# Patient Record
Sex: FEMALE | Race: WHITE | Employment: FULL TIME | URBAN - METROPOLITAN AREA
[De-identification: names, ages, dates, MRNs, and addresses within clinical notes are randomized per-mention and may not be internally consistent; named-entity substitution may affect disease eponyms.]

---

## 2017-03-09 DIAGNOSIS — M54.9 BACK PAIN WITHOUT RADICULOPATHY: Primary | ICD-10-CM

## 2017-03-09 DIAGNOSIS — M51.36 DDD (DEGENERATIVE DISC DISEASE), LUMBAR: ICD-10-CM

## 2017-03-14 ENCOUNTER — HOSPITAL ENCOUNTER (OUTPATIENT)
Dept: PHYSICAL THERAPY | Age: 42
Discharge: HOME | End: 2017-03-31
Admitting: PHYSICAL MEDICINE & REHABILITATION

## 2017-03-21 ENCOUNTER — HOSPITAL ENCOUNTER (OUTPATIENT)
Dept: PHYSICAL THERAPY | Age: 42
Discharge: HOME | End: 2017-03-21
Admitting: PHYSICAL MEDICINE & REHABILITATION

## 2017-03-23 ENCOUNTER — HOSPITAL ENCOUNTER (OUTPATIENT)
Dept: PHYSICAL THERAPY | Age: 42
Discharge: HOME | End: 2017-03-23
Admitting: PHYSICAL MEDICINE & REHABILITATION

## 2017-04-04 ENCOUNTER — OFFICE VISIT (OUTPATIENT)
Dept: ORTHOPEDIC SURGERY | Age: 42
End: 2017-04-04

## 2017-04-04 VITALS
BODY MASS INDEX: 27.31 KG/M2 | HEIGHT: 64 IN | SYSTOLIC BLOOD PRESSURE: 118 MMHG | HEART RATE: 68 BPM | DIASTOLIC BLOOD PRESSURE: 61 MMHG | WEIGHT: 160 LBS

## 2017-04-04 DIAGNOSIS — M54.16 LUMBAR RADICULITIS: ICD-10-CM

## 2017-04-04 DIAGNOSIS — M51.36 DDD (DEGENERATIVE DISC DISEASE), LUMBAR: Primary | ICD-10-CM

## 2017-04-04 PROCEDURE — 99214 OFFICE O/P EST MOD 30 MIN: CPT | Performed by: PHYSICIAN ASSISTANT

## 2017-04-04 RX ORDER — IBUPROFEN 200 MG
200 TABLET ORAL EVERY 6 HOURS PRN
COMMUNITY

## 2017-04-04 RX ORDER — METHYLPREDNISOLONE 4 MG/1
TABLET ORAL
Qty: 1 KIT | Refills: 0 | Status: SHIPPED | OUTPATIENT
Start: 2017-04-04 | End: 2017-04-10

## 2017-04-12 ENCOUNTER — TELEPHONE (OUTPATIENT)
Dept: ORTHOPEDIC SURGERY | Age: 42
End: 2017-04-12

## 2017-04-12 ENCOUNTER — OFFICE VISIT (OUTPATIENT)
Dept: ORTHOPEDIC SURGERY | Age: 42
End: 2017-04-12

## 2017-04-12 VITALS
BODY MASS INDEX: 27.33 KG/M2 | HEART RATE: 86 BPM | DIASTOLIC BLOOD PRESSURE: 77 MMHG | SYSTOLIC BLOOD PRESSURE: 113 MMHG | WEIGHT: 160.05 LBS | HEIGHT: 64 IN

## 2017-04-12 DIAGNOSIS — M54.16 LUMBAR RADICULITIS: ICD-10-CM

## 2017-04-12 DIAGNOSIS — M51.36 DDD (DEGENERATIVE DISC DISEASE), LUMBAR: Primary | ICD-10-CM

## 2017-04-12 PROCEDURE — 99214 OFFICE O/P EST MOD 30 MIN: CPT | Performed by: PHYSICIAN ASSISTANT

## 2017-04-25 ENCOUNTER — HOSPITAL ENCOUNTER (OUTPATIENT)
Dept: PAIN MANAGEMENT | Age: 42
Discharge: HOME | End: 2017-04-25
Attending: PHYSICAL MEDICINE & REHABILITATION | Admitting: PHYSICAL MEDICINE & REHABILITATION

## 2017-04-25 VITALS
OXYGEN SATURATION: 100 % | HEIGHT: 64 IN | SYSTOLIC BLOOD PRESSURE: 121 MMHG | BODY MASS INDEX: 26.46 KG/M2 | HEART RATE: 74 BPM | WEIGHT: 155 LBS | RESPIRATION RATE: 18 BRPM | TEMPERATURE: 97.9 F | DIASTOLIC BLOOD PRESSURE: 71 MMHG

## 2017-04-25 ASSESSMENT — PAIN DESCRIPTION - DESCRIPTORS: DESCRIPTORS: ACHING;DULL;SHARP

## 2017-04-25 ASSESSMENT — PAIN - FUNCTIONAL ASSESSMENT: PAIN_FUNCTIONAL_ASSESSMENT: 0-10

## 2017-05-17 ENCOUNTER — OFFICE VISIT (OUTPATIENT)
Dept: ORTHOPEDIC SURGERY | Age: 42
End: 2017-05-17

## 2017-05-17 VITALS
SYSTOLIC BLOOD PRESSURE: 123 MMHG | HEIGHT: 64 IN | BODY MASS INDEX: 26.46 KG/M2 | WEIGHT: 154.98 LBS | HEART RATE: 86 BPM | DIASTOLIC BLOOD PRESSURE: 83 MMHG

## 2017-05-17 DIAGNOSIS — M51.36 DDD (DEGENERATIVE DISC DISEASE), LUMBAR: Primary | ICD-10-CM

## 2017-05-17 PROCEDURE — L0626 LO SAG RIG PNL STAYS PRE CST: HCPCS | Performed by: PHYSICAL MEDICINE & REHABILITATION

## 2017-05-17 PROCEDURE — L0625 LO FLEX L1-BELOW L5 PRE OTS: HCPCS | Performed by: PHYSICAL MEDICINE & REHABILITATION

## 2017-05-17 PROCEDURE — 99213 OFFICE O/P EST LOW 20 MIN: CPT | Performed by: PHYSICAL MEDICINE & REHABILITATION

## 2017-07-18 ENCOUNTER — OFFICE VISIT (OUTPATIENT)
Dept: ORTHOPEDIC SURGERY | Age: 42
End: 2017-07-18

## 2017-07-18 VITALS — WEIGHT: 152 LBS | HEIGHT: 64 IN | BODY MASS INDEX: 25.95 KG/M2

## 2017-07-18 DIAGNOSIS — M70.61 TROCHANTERIC BURSITIS OF RIGHT HIP: Primary | ICD-10-CM

## 2017-07-18 PROCEDURE — 99213 OFFICE O/P EST LOW 20 MIN: CPT | Performed by: ORTHOPAEDIC SURGERY

## 2017-08-10 ENCOUNTER — OFFICE VISIT (OUTPATIENT)
Dept: DERMATOLOGY | Age: 42
End: 2017-08-10

## 2017-08-10 DIAGNOSIS — Z86.018 HISTORY OF DYSPLASTIC NEVUS: ICD-10-CM

## 2017-08-10 DIAGNOSIS — Z80.8 FAMILY HISTORY OF MELANOMA: ICD-10-CM

## 2017-08-10 DIAGNOSIS — D22.9 MULTIPLE BENIGN NEVI: Primary | ICD-10-CM

## 2017-08-10 DIAGNOSIS — Z12.83 SCREENING EXAM FOR SKIN CANCER: ICD-10-CM

## 2017-08-10 DIAGNOSIS — L57.0 ACTINIC KERATOSES: ICD-10-CM

## 2017-08-10 PROCEDURE — 17000 DESTRUCT PREMALG LESION: CPT | Performed by: DERMATOLOGY

## 2017-08-10 PROCEDURE — 99213 OFFICE O/P EST LOW 20 MIN: CPT | Performed by: DERMATOLOGY

## 2017-08-10 PROCEDURE — 17003 DESTRUCT PREMALG LES 2-14: CPT | Performed by: DERMATOLOGY

## 2017-10-31 ENCOUNTER — TELEPHONE (OUTPATIENT)
Dept: DERMATOLOGY | Age: 42
End: 2017-10-31

## 2017-10-31 NOTE — TELEPHONE ENCOUNTER
Patient calling wanting to get an appointment in [facility] with  Dr. Mark Juarez seen Dr. Mark Juarez on  8/10/17 for skin check. Patient states she has a spot on her right ear thats been there a long time, she will pick at it and it bleeds and will scab over but never goes away. She would like to get it looked at. I told her I would send message through to [facility].   Patient's number is [phone]

## 2017-11-01 NOTE — TELEPHONE ENCOUNTER
Call returned to PT re: appt - N/A L/M to call back to discuss scheduling appt.      PT last seen 8/10/17 - skin exam - AK's (freezing)

## 2017-11-16 ENCOUNTER — OFFICE VISIT (OUTPATIENT)
Dept: DERMATOLOGY | Age: 42
End: 2017-11-16

## 2017-11-16 DIAGNOSIS — D48.5 NEOPLASM OF UNCERTAIN BEHAVIOR OF SKIN: Primary | ICD-10-CM

## 2017-11-16 PROCEDURE — 69100 BIOPSY OF EXTERNAL EAR: CPT | Performed by: DERMATOLOGY

## 2017-11-16 NOTE — PROGRESS NOTES
[facility]) Dermatology  Cyril Portillo MD  [phone]      Geno Maurice  1975    43 y.o. female     Date of Visit: 11/16/2017    Last Visit: 3mo    Chief Complaint: Lesion    History of Present Illness:  1. Complains of recurrent sore on R ear that has been flaring every 6mo or so for the past 2 years. Pt admits to manipulating lesion frequently and attributes this to the fact that it takes 1-2mo to heal. No associated pain. Derm History:   -Hx genital HSV (R buttock) - previously treated w/ famvir  -Hx perioral dermatitis - treated w/ tetracycline, PO erythromycin and metrocream  -Mild ET/PP rosacea - metrogel prn   -History of actinic keratoses s/p cryotherapy.    -Hx dysplastic nevi    Review of Systems: None     Past Medical History, Surgical History, Medications and Allergies reviewed. Past Medical History:   Diagnosis Date    Endometriosis 0    H/O blood clots 06/2017    RT leg X 2  per PT     Headache      Past Surgical History:   Procedure Laterality Date    CERVICAL POLYP REMOVAL      CHOLECYSTECTOMY, LAPAROSCOPIC  1998    LAPAROSCOPIC APPENDECTOMY  1998       Allergies   Allergen Reactions    Bee Venom     Penicillins     Mastisol Adhesive [Wound Dressing Adhesive] Swelling and Rash     Adhesive for steri-strips     Outpatient Prescriptions Marked as Taking for the 11/16/17 encounter (Office Visit) with Cyril Portillo MD   Medication Sig Dispense Refill    rivaroxaban (XARELTO) 20 MG TABS tablet Take 20 mg by mouth      ibuprofen (ADVIL;MOTRIN) 200 MG tablet Take 200 mg by mouth every 6 hours as needed for Pain      ferrous sulfate 325 (65 FE) MG tablet Take 325 mg by mouth daily (with breakfast)      amitriptyline (ELAVIL) 10 MG tablet Start with 1 tab by mouth nightly; may increase dose to 2, then 3 tabs nightly as tolerated.  (Patient taking differently: Indications: RI for migraines per PT Start with 1 tab by mouth nightly; may increase dose to 2, then 3 tabs nightly as

## 2017-11-16 NOTE — PATIENT INSTRUCTIONS

## 2017-11-27 ENCOUNTER — TELEPHONE (OUTPATIENT)
Dept: DERMATOLOGY | Age: 42
End: 2017-11-27

## 2017-11-27 DIAGNOSIS — C44.212 BASAL CELL CARCINOMA OF HELIX, RIGHT: Primary | ICD-10-CM

## 2017-11-27 NOTE — TELEPHONE ENCOUNTER
Spoke with patient and informed her of biopsy result from 11-16-17. 1.Location: Right superior helix     Result: Basal cell carcinoma, metatypical, infiltrating pattern. Plan: Refer to Mohs. Patient referred to Aleja Yung for Mohs, given all pertinent information. A six month skin exam is scheduled for 6-11-18.

## 2017-11-30 ENCOUNTER — TELEPHONE (OUTPATIENT)
Dept: DERMATOLOGY | Age: 42
End: 2017-11-30

## 2017-11-30 DIAGNOSIS — C44.212: Primary | ICD-10-CM

## 2017-11-30 NOTE — TELEPHONE ENCOUNTER
Spoke with patient, gave her names and information for , Bonita Miles, and Julia Carver. She will check them out and call their office to see if they are able to get her in before 12-31-17. Advised her to let us know if she decides to go elsewhere and I would be happy to forward referral and info.

## 2018-03-27 ENCOUNTER — OFFICE VISIT (OUTPATIENT)
Dept: ORTHOPEDIC SURGERY | Age: 43
End: 2018-03-27

## 2018-03-27 VITALS
HEART RATE: 82 BPM | SYSTOLIC BLOOD PRESSURE: 123 MMHG | DIASTOLIC BLOOD PRESSURE: 83 MMHG | HEIGHT: 64 IN | WEIGHT: 151.9 LBS | BODY MASS INDEX: 25.93 KG/M2

## 2018-03-27 DIAGNOSIS — M70.61 GREATER TROCHANTERIC BURSITIS OF RIGHT HIP: ICD-10-CM

## 2018-03-27 DIAGNOSIS — M25.551 RIGHT HIP PAIN: Primary | ICD-10-CM

## 2018-03-27 PROCEDURE — 99215 OFFICE O/P EST HI 40 MIN: CPT | Performed by: ORTHOPAEDIC SURGERY

## 2018-03-27 NOTE — PROGRESS NOTES
Diagnosis    Enthesopathy of hip region    Tear of lateral cartilage or meniscus of knee, current    Scapular dyskinesis    Tendinitis of right rotator cuff    Hashimoto's thyroiditis- Dr Emerald Alatorre Endometriosis    PMS (premenstrual syndrome)    Pelvic pain in female    Chronic tension-type headache, not intractable    Trochanteric bursitis of right hip    Subfertility, female    Other ovarian dysfunction- luteal progesterone/estradiol defect     Past Medical History:   Diagnosis Date    Endometriosis 1998    H/O blood clots 06/2017    RT leg X 2  per PT     Headache      Past Surgical History:   Procedure Laterality Date    CERVICAL POLYP REMOVAL      CHOLECYSTECTOMY, LAPAROSCOPIC  1998    LAPAROSCOPIC APPENDECTOMY  1998       Allergies:  Bee venom; Penicillins; and Mastisol adhesive [wound dressing adhesive]    Medications:  Outpatient Prescriptions Marked as Taking for the 3/27/18 encounter (Office Visit) with Desmond Rivera MD   Medication Sig Dispense Refill    norethindrone (AYGESTIN) 5 MG tablet Take 5 mg by mouth      rivaroxaban (XARELTO) 20 MG TABS tablet Take 20 mg by mouth      rivaroxaban 15 & 20 MG Starter Pack Take by mouth Indications: Xarelto       ibuprofen (ADVIL;MOTRIN) 200 MG tablet Take 200 mg by mouth every 6 hours as needed for Pain      ferrous sulfate 325 (65 FE) MG tablet Take 325 mg by mouth daily (with breakfast)      amitriptyline (ELAVIL) 10 MG tablet Start with 1 tab by mouth nightly; may increase dose to 2, then 3 tabs nightly as tolerated.  (Patient taking differently: Indications: CT for migraines per PT Start with 1 tab by mouth nightly; may increase dose to 2, then 3 tabs nightly as tolerated.) 90 tablet 3    levothyroxine (SYNTHROID) 50 MCG tablet Take 50 mcg by mouth      Prenatal Vit-Fe Fumarate-FA (PREPLUS) 27-1 MG TABS        Social History     Social History    Marital status:      Spouse name: Cori Montenegro Number of children: 0    Years of rashes  [x] Leg lengths equal  [] Ecchymosis:  [x] none  [] mild  [] moderate  [] severe   [] Atrophy:  [x] none  [] mild  [] moderate  [] severe      Range of Motion:  [x] No flexion contracture         [] Deferred: acute injury/post-surgery/pain  [] Flexion contracture     Forward flexion: 110  Supine Internal rotation: 20 Negative labral stress test  Supine External rotation: 65  Abduction: 60  Adduction: 30      Palpation:   Moderate Tenderness over greater trochanter      Provocative Tests:  [x] Negative  Positive Tests:  [] Log Roll   [] Gavin Test: ITB Tightness   [] FADIR Anterior impingement: Negative   [] Posterior Impingement    [] Shuck test for insufficient suction seal   [] Dial test for capsular insufficiency:    [] Resisted adduction for athletic pubalgia   [] Resisted curl up for athletic pubalgia     Motor Function:  [x] No gross motor weakness of hip [x] No gross motor weakness of knee  [x] No gross motor weakness of ankle    [x] No gross motor weakness of great toe    [] Motor strength:   [x] Hip Flex [x] 5/5 [] 4/5 [] 3/5 [] 2/5 [] 1/5 [] 0/5   [x] Hip ABductors [x] 5-/5 [] 4/5 [] 3/5 [] 2/5 [] 1/5 [] 0/5   [x] Hip ADductors [x] 5/5 [] 4/5 [] 3/5 [] 2/5 [] 1/5 [] 0/5     Neurologic:  [x] Sensation to light touch intact  [x] Coordination / proprioception intact    Circulation:  [x] The limb is warm and well perfused. [x] Capillary refill is intact. [] Edema:  [x] none  [] mild  [] moderate  [] severe     Data Reviewed:     XRays:  (2 views: Standing AP, 45 degree Jules) of her right hip and pelvis taken today 3/27/18 in the office and reviewed by me personally showed: Well preserved joint space. No radiolucent lines to suggest fracture or any osteoblastic lesions. Other Imaging:  none    Assessment:     Mayuri Craft is a 43y.o. year old female who presents with Chronic right sided Greater trochanteric pain syndrome.   This is a condition which is comprised of trochanteric bursitis, IT band focal tightness, and gluteus medius tendinopathy. It is a chronic condition very commonly seen in this age group. Generally, speaking extra-articular etiologies of hip pain respond extremely well to nonoperative management involving PT, injections and/or NSAIDs, although the rate of clinical improvement is slow and requires patience and consistency with therapy. Patient has had these symptoms for several years and has had a variety of treatments. She still desires to treat this conservatively    Diagnosis:   1. Right hip pain  XR HIP RIGHT (2-3 VIEWS)   2. Greater trochanteric bursitis of right hip  [facility] - [facility]         Plan:     I discussed the diagnosis and the treatment options with Brock Valenzuela today as well as the nature of the condition. I am going to refer for PT using my specialized program for this condition. We will see if this provides her any benefit. It is possible that her muscles just need a bit of a different approach for strengthening. Additionally, I indicated to her that we can do trochanteric injections again every 2 years as I think this is relatively safe. However, we likely will send her for an MRI if she has no significant improvement over the next few weeks. Return to Clinic/Follow - Up:  6-8 weeks PRN    Brock Valenzuela was instructed to call the office if her symptoms worsen or if new symptoms appear prior to the next scheduled visit. She is specifically instructed to contact the office between now & her scheduled appointment if she has concerns related to her condition or if she needs assistance in scheduling the above tests. She is welcome to call for an appointment sooner if she has any additional concerns or questions. Patient Education Materials Provided:  [x] Dr Campos Laughter: New patient folder,  Anatomic Drawings and treatment algorithms    There are no Patient Instructions on file for this visit.        Orders Placed This

## 2018-04-08 ENCOUNTER — TELEPHONE (OUTPATIENT)
Dept: ORTHOPEDIC SURGERY | Age: 43
End: 2018-04-08

## 2018-06-11 ENCOUNTER — OFFICE VISIT (OUTPATIENT)
Dept: DERMATOLOGY | Age: 43
End: 2018-06-11

## 2018-06-11 DIAGNOSIS — L57.0 ACTINIC KERATOSES: ICD-10-CM

## 2018-06-11 DIAGNOSIS — D22.9 MULTIPLE BENIGN NEVI: Primary | ICD-10-CM

## 2018-06-11 DIAGNOSIS — Z85.828 HISTORY OF NONMELANOMA SKIN CANCER: ICD-10-CM

## 2018-06-11 DIAGNOSIS — Z12.83 SCREENING EXAM FOR SKIN CANCER: ICD-10-CM

## 2018-06-11 DIAGNOSIS — Z80.8 FAMILY HISTORY OF MELANOMA: ICD-10-CM

## 2018-06-11 DIAGNOSIS — Z86.018 HISTORY OF DYSPLASTIC NEVUS: ICD-10-CM

## 2018-06-11 DIAGNOSIS — D48.5 NEOPLASM OF UNCERTAIN BEHAVIOR OF SKIN: ICD-10-CM

## 2018-06-11 PROCEDURE — 99213 OFFICE O/P EST LOW 20 MIN: CPT | Performed by: DERMATOLOGY

## 2018-06-11 PROCEDURE — 11100 PR BIOPSY OF SKIN LESION: CPT | Performed by: DERMATOLOGY

## 2018-06-11 PROCEDURE — 17000 DESTRUCT PREMALG LESION: CPT | Performed by: DERMATOLOGY

## 2018-06-11 PROCEDURE — 17003 DESTRUCT PREMALG LES 2-14: CPT | Performed by: DERMATOLOGY

## 2018-06-11 RX ORDER — ASCORBIC ACID 500 MG
500 TABLET ORAL DAILY
COMMUNITY

## 2018-06-19 ENCOUNTER — TELEPHONE (OUTPATIENT)
Dept: DERMATOLOGY | Age: 43
End: 2018-06-19

## 2018-07-02 ENCOUNTER — NURSE ONLY (OUTPATIENT)
Dept: DERMATOLOGY | Age: 43
End: 2018-07-02

## 2018-07-02 DIAGNOSIS — L81.4 SOLAR LENTIGO: Primary | ICD-10-CM

## 2018-07-02 PROCEDURE — 99211 OFF/OP EST MAY X REQ PHY/QHP: CPT | Performed by: DERMATOLOGY

## 2018-07-02 NOTE — PROGRESS NOTES
Patient concerned that area on chest is still red from biopsy on chest 6-11-18. Area is slightly red but very well healed with NO signs of infection. Advised to continue with Aquaphor, try not to manipulate or scratch area. Give a little more time for redness to go away. Patient agreed. Left office in no acute distress.

## 2018-07-17 ENCOUNTER — OFFICE VISIT (OUTPATIENT)
Dept: ORTHOPEDIC SURGERY | Age: 43
End: 2018-07-17

## 2018-07-17 ENCOUNTER — PRE-EVALUATION (OUTPATIENT)
Dept: ORTHOPEDIC SURGERY | Age: 43
End: 2018-07-17

## 2018-07-17 VITALS
HEART RATE: 68 BPM | HEIGHT: 64 IN | BODY MASS INDEX: 23.22 KG/M2 | SYSTOLIC BLOOD PRESSURE: 110 MMHG | WEIGHT: 136 LBS | DIASTOLIC BLOOD PRESSURE: 68 MMHG

## 2018-07-17 DIAGNOSIS — M22.41 CHONDROMALACIA OF RIGHT PATELLOFEMORAL JOINT: ICD-10-CM

## 2018-07-17 DIAGNOSIS — M25.561 RIGHT KNEE PAIN, UNSPECIFIED CHRONICITY: Primary | ICD-10-CM

## 2018-07-17 PROCEDURE — APPNB30 APP NON BILLABLE TIME 0-30 MINS: Performed by: PHYSICIAN ASSISTANT

## 2018-07-17 PROCEDURE — 99214 OFFICE O/P EST MOD 30 MIN: CPT | Performed by: ORTHOPAEDIC SURGERY

## 2018-07-17 NOTE — PROGRESS NOTES
Knee Brace  [] Cane  [] Crutches   [] Ree Harness   [] Wheelchair  [] Other    RIGHT Knee ORTHOPAEDIC  EXAM:   Inspection:  [x] Skin intact without abrasion, lacerations or rashes  [x] Leg lengths equal  [x] Effusion:  [] none  [] mild  [] moderate  [] severe     Range of Motion:  [x] No flexion contracture         [] Deferred: acute injury/post-surgery/pain    [x]Flexion: 0-130° with mild discomfort at end range flexion    Palpation:   [] No Tenderness  [x] Tenderness: Diffuse tenderness medially [x] mild  [] moderate  [] severe   [] Patellofemoral Crepitation:  [] none  [x] mild  [] moderate  [] severe    Ligamentous and Provocative testing:  [x] Negative - Stable in varus/valgus at 30 and 0 deg flexion, negative posterior drawer, negative Lachman    Motor Function:  [x] No gross motor weakness of hip [x] No gross motor weakness of knee  [x] No gross motor weakness of ankle    [x] No gross motor weakness of great toe    [x] Motor strength: 5/5 L4-S1    Neurologic:   [x] Sensation to light touch intact  [x] Coordination / proprioception intact  Motor function intact L2-S1    Circulation:  [x] The limb is warm and well perfused. [x] Capillary refill is intact. [x] Edema:  [x] none  [] mild  [] moderate  [] severe     Negative Homans sign - no calf tenderness    Additional Examinations:         Contralateral Lower Extremity: Examination of the contralateral lower extremity does not show any tenderness, deformity or injury. Range of motion is unremarkable. There is no gross instability. There are no rashes, ulcerations or lesions. Strength and tone are normal.    Radiographic:  4 xray of the right  knee taken in our office today 7/17/18 reveal no fractures, dislocations, visible tumors, or signs of acute trauma. There are mild-to-moderate degenerative changes in the patellofemoral compartment.        Assessment :  12-year-old female with right knee pain that appears to be from patellofemoral

## 2018-09-11 ENCOUNTER — OFFICE VISIT (OUTPATIENT)
Dept: ORTHOPEDIC SURGERY | Age: 43
End: 2018-09-11

## 2018-09-11 VITALS
SYSTOLIC BLOOD PRESSURE: 100 MMHG | DIASTOLIC BLOOD PRESSURE: 69 MMHG | WEIGHT: 136 LBS | BODY MASS INDEX: 23.22 KG/M2 | HEIGHT: 64 IN | HEART RATE: 73 BPM

## 2018-09-11 DIAGNOSIS — M25.561 RIGHT KNEE PAIN, UNSPECIFIED CHRONICITY: Primary | ICD-10-CM

## 2018-09-11 DIAGNOSIS — M22.41 CHONDROMALACIA OF RIGHT PATELLOFEMORAL JOINT: ICD-10-CM

## 2018-09-11 PROCEDURE — 20610 DRAIN/INJ JOINT/BURSA W/O US: CPT | Performed by: ORTHOPAEDIC SURGERY

## 2018-09-11 PROCEDURE — 99214 OFFICE O/P EST MOD 30 MIN: CPT | Performed by: ORTHOPAEDIC SURGERY

## 2018-09-11 NOTE — PROGRESS NOTES
EXAM:   Inspection:  [x] Skin intact without abrasion, lacerations or rashes  [x] Leg lengths equal  [x] Effusion:  [x] none  [] mild  [] moderate  [] severe     Range of Motion:  [x] No flexion contracture         [] Deferred: acute injury/post-surgery/pain   [x]Flexion: 0-130 degrees    Palpation:   [] No Tenderness  [x] Tenderness: diffuse [x] mild  [] moderate  [] severe   [x] Patellofemoral Crepitation:  [] none  [x] mild  [] moderate  [] severe    Ligamentous and Provocative testing:  [x] Negative - Stable in varus/valgus at 30 and 0 deg flexion, negative posterior drawer, negative Lachman    Motor Function:  [x] No gross motor weakness of hip [x] No gross motor weakness of knee  [x] No gross motor weakness of ankle    [x] No gross motor weakness of great toe    [x] Motor strength: 5/5 L4-S1    Neurologic:   [x] Sensation to light touch intact  [x] Coordination / proprioception intact  Motor function intact L2-S1    Circulation:  [x] The limb is warm and well perfused. [x] Capillary refill is intact. [x] Edema:  [x] none  [] mild  [] moderate  [] severe     Negative Homans sign - no calf tenderness    Additional Examinations:         Contralateral Lower Extremity: Examination of the contralateral lower extremity does not show any tenderness, deformity or injury. Range of motion is unremarkable. There is no gross instability. There are no rashes, ulcerations or lesions. Strength and tone are normal.    Radiographic:  No new imaging studies were obtained today. Assessment :  36 y/o female with right knee pain that appear to be due to patellofemoral chondromalacia. She does have a history of right knee worker's comp injury in which she underwent right knee surgery in 1999 and 2000. Impression:  Encounter Diagnoses   Name Primary?     Right knee pain, unspecified chronicity Yes    Chondromalacia of right patellofemoral joint      Treatment Plan:  I did discuss the diagnosis and treatment options in my presence, and it is both accurate and complete.       Lulú Herman MD  Orthopaedic Surgeon, Sports Medicine  Director, Hip Arthroscopy and [address] [facility]  Molly [place] () - [phone]

## 2018-10-17 ENCOUNTER — TELEPHONE (OUTPATIENT)
Dept: ORTHOPEDIC SURGERY | Age: 43
End: 2018-10-17

## 2018-10-17 NOTE — TELEPHONE ENCOUNTER
Received a call from Mrs. Vale Davis.   She had a cortisone injection before she left town. The injection really flared up her hip and knee. She is wondering why this would happen. She previous saw Dr. Abena Chinchilla for her knee.   Requesting a return call [phone] from Dr. Dorathy Apley PA

## 2018-10-18 NOTE — TELEPHONE ENCOUNTER
SPOKE WITH THE PATIENT TODAY, AND SHE AGREES TO PLAN OF CARE GIVEN. SHE STILL WANTS THE INJECTION, AND I EXPLAINED TO HER I WILL FOLLOW UP WITH WC. DEPT.

## 2018-11-08 ENCOUNTER — OFFICE VISIT (OUTPATIENT)
Dept: ORTHOPEDIC SURGERY | Age: 43
End: 2018-11-08
Payer: COMMERCIAL

## 2018-11-08 VITALS
HEART RATE: 91 BPM | HEIGHT: 64 IN | BODY MASS INDEX: 23.22 KG/M2 | SYSTOLIC BLOOD PRESSURE: 105 MMHG | DIASTOLIC BLOOD PRESSURE: 71 MMHG | WEIGHT: 136 LBS

## 2018-11-08 DIAGNOSIS — M17.11 PRIMARY OSTEOARTHRITIS OF RIGHT KNEE: Primary | ICD-10-CM

## 2018-11-08 PROCEDURE — 99213 OFFICE O/P EST LOW 20 MIN: CPT | Performed by: ORTHOPAEDIC SURGERY

## 2018-11-09 NOTE — PROGRESS NOTES
Smokeless tobacco: Never Used    Alcohol use 0.0 oz/week      Comment: rare    Drug use: No    Sexual activity: Yes     Partners: Male      Comment: Bebo     Other Topics Concern    None     Social History Narrative    None       Current Outpatient Prescriptions   Medication Sig Dispense Refill    diclofenac sodium 1 % GEL Apply 4 g topically 4 times daily 2 Tube 5    vitamin C (ASCORBIC ACID) 500 MG tablet Take 500 mg by mouth daily      Glucosamine-Chondroit-Vit C-Mn (GLUCOSAMINE 1500 COMPLEX PO) Take by mouth      norethindrone (AYGESTIN) 5 MG tablet Take 5 mg by mouth      ibuprofen (ADVIL;MOTRIN) 200 MG tablet Take 200 mg by mouth every 6 hours as needed for Pain      ferrous sulfate 325 (65 FE) MG tablet Take 325 mg by mouth daily (with breakfast)      amitriptyline (ELAVIL) 10 MG tablet Start with 1 tab by mouth nightly; may increase dose to 2, then 3 tabs nightly as tolerated. (Patient taking differently: Indications: IA for migraines per PT Start with 1 tab by mouth nightly; may increase dose to 2, then 3 tabs nightly as tolerated.) 90 tablet 3    levothyroxine (SYNTHROID) 50 MCG tablet Take 50 mcg by mouth      Prenatal Vit-Fe Fumarate-FA (PREPLUS) 27-1 MG TABS        No current facility-administered medications for this visit. Allergies   Allergen Reactions    Bee Venom     Penicillins     Mastisol Adhesive [Wound Dressing Adhesive] Swelling and Rash     Adhesive for steri-strips       Review of Systems:  A 14 point review of systems was completed by the patient and is available in the media section of the scanned medical record and was reviewed on 11/9/2018. The review is negative with the exception of those things mentioned in the HPI and Past Medical History     Vital Signs:   /71   Pulse 91   Ht 5' 4\" (1.626 m)   Wt 136 lb (61.7 kg)   BMI 23.34 kg/m²     General Exam:   Mental Status: The patient is oriented to time, place and person.   The patient's mood and affect

## 2018-11-14 ENCOUNTER — HOSPITAL ENCOUNTER (OUTPATIENT)
Dept: PHYSICAL THERAPY | Age: 43
Setting detail: THERAPIES SERIES
Discharge: HOME | End: 2018-11-14
Payer: COMMERCIAL

## 2018-11-14 PROCEDURE — 97530 THERAPEUTIC ACTIVITIES: CPT | Performed by: PHYSICAL THERAPIST

## 2018-11-14 PROCEDURE — 97110 THERAPEUTIC EXERCISES: CPT | Performed by: PHYSICAL THERAPIST

## 2018-11-14 PROCEDURE — G8978 MOBILITY CURRENT STATUS: HCPCS | Performed by: PHYSICAL THERAPIST

## 2018-11-14 PROCEDURE — 97162 PT EVAL MOD COMPLEX 30 MIN: CPT | Performed by: PHYSICAL THERAPIST

## 2018-11-14 PROCEDURE — G8979 MOBILITY GOAL STATUS: HCPCS | Performed by: PHYSICAL THERAPIST

## 2018-11-14 NOTE — PLAN OF CARE
medial meniscectomy dating back to 1999 and 2000. This case was all handled by [facility]. Since that time she has also been seen by Dr. Merissa Plasencia for hip issues. The patient returns today due to continuing R knee pain. The patient reports that around late April she started to notice some swelling pain in her knee. She notes feeling a burning sensation under her knee/ kneecap. This pain occurs all the time and is reported as a 6 out of 10 at worse and 4 our of 10 at best when she is at rest. She notes attempting to continue with normal exercise and is able to complete exercise but notes having this constant pain. She does note having a cortisone injection to her R knee from Dr. Merissa Plasencia back in September, but notes this did not help. The patient reports no new injuries at this time. She indicates she is hoping to be able to receive another cortisone injectcion in December. Relevant Medical History:History for R LE DVT; R knee PFA/ OA; prior vascular repair; occasional LE numbness/ paresthesias (hip and LBP)  Functional Disability Index:PT G-Codes  Functional Assessment Tool Used: LEFS  Score: 58/ 80= 73%  Functional Limitation: Mobility: Walking and moving around  Mobility: Walking and Moving Around Current Status (): At least 20 percent but less than 40 percent impaired, limited or restricted  Mobility: Walking and Moving Around Goal Status ():  At least 1 percent but less than 20 percent impaired, limited or restricted    Pain Scale: 4/10  @ rest/ constant  6-7/ 10 @ worst  Easing factors: Rest/ ice/ meds  Provocative factors: Prolonged standing/ walking; stairs; kneeling and squatting; progressive exercise (bike)     Type: [x]Constant   []Intermittent  []Radiating []Localized []other:     Numbness/Tingling: Occasional; generalized to R LE    Occupation/School: Works full time from home; sedentary    Living Status/Prior Level of Function: Independent with ADLs and IADLs, Full NI cardio routines for elliptical cross training and stationary/ outdoors cycling; full stretching and strength training. OBJECTIVE:     LEFS Score: 58/ 80= 73% (11/14/18; Initial)     11-14-18  Flexibility L R Comment   Hamstring + +    Gastroc + +    ITB + ++    Quad + +            11-14-18  ROM PROM AROM Overpressure Comment    L R L R L R    Flexion 150 145        Extension +6 +4                              11-14-18  Strength L R Comment   Quad 5-/5 4-/5 JORGITO 0°   Hamstring 4+/5 4-/5 (pain)    Gastroc 4+/5 4/5    Hip  flexion 4+/5 4-/5 (pain)    Hip abd 4+/5 3+/5 (pain)                  11-14-18  Special Test Results/Comment   Meniscal Click (-)  (+)Pain with flexion and rotation   Crepitus 1+/3  30-0°   Flexion Test (+) Pain at deep flexion   Valgus Laxity (-)   Varus Laxity (-)   Lachmans (-)   Drop Back (-)   Homans (-)         11-14-18  Girth L R   Calf 36.8 37.5   Mid Patella 34.9 35.8   Suprapatellar 37.5 38.5   5cm above 42.0 41.9   15cm above 49.0 49.4     Reflexes/Sensation:    []Dermatomes/Myotomes intact    []Reflexes equal and normal bilaterally   [x]Other: NT    Joint mobility:    []Normal    []Hypo   [x]Hyper    Palpation: Tender along the inferior pole of the patella> patellar tendon> tibial tubercle; tenderness at the greater trochanter (difficulty laying on the R side)    Functional Mobility/Transfers: Independent    Posture: Mild genu varum; HE relaxed posture    Bandages/Dressings/Incisions: Previous healed    Gait: FWB; mild antalgia R calf high IRENE; patellar knee sleeve (available; uses PRN)    Orthopedic Special Tests:                        [x] Patient history, allergies, meds reviewed. Medical chart reviewed. See intake form. Review Of Systems (ROS):  [x]Performed Review of systems (Integumentary, CardioPulmonary, Neurological) by intake and observation. Intake form has been scanned into medical record.  Patient has been instructed to contact their primary care physician regarding ROS issues if not already being control   [x]Decreased LE functional strength   [x]Reduced balance/proprioceptive control   []other:      Functional Activity Limitations (from functional questionnaire and intake)   [x]Reduced ability to tolerate prolonged functional positions   [x]Reduced ability or difficulty with changes of positions or transfers between positions   [x]Reduced ability to maintain good posture and demonstrate good body mechanics with sitting, bending, and lifting   []Reduced ability to sleep   [] Reduced ability or tolerance with driving and/or computer work   [x]Reduced ability to perform lifting, carrying tasks   [x]Reduced ability to squat   [x]Reduced ability to forward bend   [x]Reduced ability to ambulate prolonged functional periods/distances/surfaces   [x]Reduced ability to ascend/descend stairs   [x]Reduced ability to run, hop or jump   []other:     Participation Restrictions   []Reduced participation in self care activities   [x]Reduced participation in home management activities   []Reduced participation in work activities   [x]Reduced participation in social activities. [x]Reduced participation in sport activities. Classification :    [x]Signs/symptoms consistent with post-surgical status including decreased ROM, strength and function.    []Signs/symptoms consistent with joint sprain/strain   [x]Signs/symptoms consistent with patella-femoral syndrome   [x]Signs/symptoms consistent with knee OA/hip OA   []Signs/symptoms consistent with internal derangement of knee/Hip   [x]Signs/symptoms consistent with functional hip weakness/NMR control      []Signs/symptoms consistent with tendinitis/tendinosis    []signs/symptoms consistent with pathology which may benefit from Dry needling      []other:      Prognosis/Rehab Potential:      []Excellent   [x]Good    []Fair   []Poor    Tolerance of evaluation/treatment:    []Excellent   [x]Good    [x]Fair   []Poor    Physical Therapy Evaluation Complexity Justification  [x] A

## 2018-11-27 ENCOUNTER — HOSPITAL ENCOUNTER (OUTPATIENT)
Dept: PHYSICAL THERAPY | Age: 43
Setting detail: THERAPIES SERIES
Discharge: HOME | End: 2018-11-27
Payer: COMMERCIAL

## 2018-11-27 PROCEDURE — 97530 THERAPEUTIC ACTIVITIES: CPT | Performed by: PHYSICAL THERAPIST

## 2018-11-27 PROCEDURE — 97110 THERAPEUTIC EXERCISES: CPT | Performed by: PHYSICAL THERAPIST

## 2018-11-27 NOTE — FLOWSHEET NOTE
Resistance/Repetitions Other comments 11/27/2018   Stretching      Hamstring 30\"x 5  x   Hip Flexion      ITB 30\"x 5  x   Grion      Quad      Inclined Calf 30\"x 5  x   Towel Pull            SLR      Supine      Prone 3x 10 Standing vs over table TRS   Abduction      Adducton      SLR+            Isometrics      Quad sets            Patellar Glides      Medial      Superior      Inferior            ROM      Passive      Active      Weight Shift      Hang Weights      Sheet Pulls      Ankle Pumps            CKC      Calf raises 3x 10  MWF   Wall sits      Step ups      1 leg stand 30\"x 3 each Tandem; stable platform MWF   Squatting      CC TKE      Balance            PRE      Extension 3x 10 RANGE: 30-0; MWF   Flexion 3x 10 RANGE: 0-90; MWF         Cable Column            Leg Press  RANGE:          Bike      Elliptical      Treadmill            Seated clams 3x 10 Red TRS   Sidelying clams 3x 10 0# TRS   Bridging 3x 10 PS TRS           Other Therapeutic Activities:   Elliptical Cross Trainer  3x week  [employer] UD cross training  3x week  Home NMES    Quads  Esteban Santoro presents with 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 A muscle stim device with conductive garment is being prescribed to facilitate strengthening of their quad contraction. This is in accordance with the therapist's established rehabilitation plan to treat quad atrophy. MD Alicia Durham, PT    DJO Lateral J Brace: episodic use    Home Exercise Program:   See above and attached. Initial HEP discussed and completed. Full written, verbal, and demonstration provided. Patient Education:      Full conservative instructions provided. Written and verbal guidelines provided for but not limited to: DME/ HEP/ ICE/ gait/ general medical instructions. Discussion regarding the use of home NMES to supplement her program due to current pain limitations.     Manual Treatments:       Therapeutic Exercise and NMR EXR  [x] (56518) Provided verbal/tactile cueing for activities related to strengthening, flexibility, endurance, ROM for improvements in LE, proximal hip, and core control with self care, mobility, lifting, ambulation. [x] (74794) Provided verbal/tactile cueing for activities related to improving balance, coordination, kinesthetic sense, posture, motor skill, proprioception  to assist with LE, proximal hip, and core control in self care, mobility, lifting, ambulation and eccentric single leg control.      NMR and Therapeutic Activities:    [x] (76632 or 04183) Provided verbal/tactile cueing for activities related to improving balance, coordination, kinesthetic sense, posture, motor skill, proprioception and motor activation to allow for proper function of core, proximal hip and LE with self care and ADLs  [] (58532) Gait Re-education- Provided training and instruction to the patient for proper LE, core and proximal hip recruitment and positioning and eccentric body weight control with ambulation re-education including up and down stairs     Home Exercise Program:    [x] (07586) Reviewed/Progressed HEP activities related to strengthening, flexibility, endurance, ROM of core, proximal hip and LE for functional self-care, mobility, lifting and ambulation/stair navigation   [x] (68263)Reviewed/Progressed HEP activities related to improving balance, coordination, kinesthetic sense, posture, motor skill, proprioception of core, proximal hip and LE for self care, mobility, lifting, and ambulation/stair navigation      Manual Treatments:  PROM / STM / Oscillations-Mobs:  G-I, II, III, IV (PA's, Inf., Post.)  [] (58615) Provided manual therapy to mobilize LE, proximal hip and/or LS spine soft tissue/joints for the purpose of modulating pain, promoting relaxation,  increasing ROM, reducing/eliminating soft tissue swelling/inflammation/restriction, improving soft tissue extensibility and allowing for proper ROM for normal

## 2018-12-05 ENCOUNTER — TELEPHONE (OUTPATIENT)
Dept: ORTHOPEDIC SURGERY | Age: 43
End: 2018-12-05

## 2018-12-07 ENCOUNTER — OFFICE VISIT (OUTPATIENT)
Dept: ORTHOPEDIC SURGERY | Age: 43
End: 2018-12-07
Payer: COMMERCIAL

## 2018-12-07 VITALS
HEART RATE: 84 BPM | SYSTOLIC BLOOD PRESSURE: 145 MMHG | BODY MASS INDEX: 23.05 KG/M2 | HEIGHT: 64 IN | DIASTOLIC BLOOD PRESSURE: 81 MMHG | WEIGHT: 135 LBS

## 2018-12-07 DIAGNOSIS — M25.551 RIGHT HIP PAIN: Primary | ICD-10-CM

## 2018-12-07 PROCEDURE — 20610 DRAIN/INJ JOINT/BURSA W/O US: CPT | Performed by: ORTHOPAEDIC SURGERY

## 2018-12-07 PROCEDURE — 99213 OFFICE O/P EST LOW 20 MIN: CPT | Performed by: ORTHOPAEDIC SURGERY

## 2018-12-07 NOTE — PROGRESS NOTES
contracture    Forward flexion: 110  Supine Internal rotation: 20 Negative labral stress test  Supine External rotation: 65  Abduction: 60  Adduction: 30      Palpation:   Positive Tenderness over greater trochanter    Provocative Tests:  [] Negative  Positive Tests:  [] Log Roll   [x] Gavin Test: ITB Tightness   [] FADIR Anterior impingement: Negative   [] Posterior Impingement    [] Shuck test for insufficient suction seal   [] Dial test for capsular insufficiency:    [] Resisted adduction for athletic pubalgia   [] Resisted curl up for athletic pubalgia     Motor Function:  [x] No gross motor weakness of hip [x] No gross motor weakness of knee  [x] No gross motor weakness of ankle    [x] No gross motor weakness of great toe    [] Motor strength:   [x] Hip Flex [x] 5/5 [] 4/5 [] 3/5 [] 2/5 [] 1/5 [] 0/5   [x] Hip ABductors [x] 5-/5 [] 4/5 [] 3/5 [] 2/5 [] 1/5 [] 0/5   [x] Hip ADductors [x] 5/5 [] 4/5 [] 3/5 [] 2/5 [] 1/5 [] 0/5     Neurologic:   [x] Sensation to light touch intact  [x] Coordination / proprioception intact  Motor function intact L2-S1    Circulation:  [x] The limb is warm and well perfused. [x] Capillary refill is intact. [] Edema:  [x] none  [] mild  [] moderate  [] severe         Assessment:     Bruce Garcia is a 37y.o. year old female who presents with Right sided Greater trochanteric pain syndrome. Although we previously felt this is a very chronic issue, upon further review of her IT band related problems that she's had over the last few years I do believe that this is related to the IT band issues in her right lower leg. Chronic IT band tightness stemming from a variety causes can cause an exquisite amount of inflammatory pain over the greater trochanteric bursa which I believe is what is the main issue with her today. Diagnosis:    Diagnosis Orders   1.  Right hip pain  AL METHYLPREDNISOLONE 40 MG INJ    65824 - AL DRAIN/INJECT LARGE JOINT/BURSA         Plan:     I discussed the

## 2018-12-17 ENCOUNTER — OFFICE VISIT (OUTPATIENT)
Dept: DERMATOLOGY | Age: 43
End: 2018-12-17
Payer: COMMERCIAL

## 2018-12-17 DIAGNOSIS — D22.9 MULTIPLE BENIGN NEVI: Primary | ICD-10-CM

## 2018-12-17 DIAGNOSIS — Z12.83 SCREENING EXAM FOR SKIN CANCER: ICD-10-CM

## 2018-12-17 DIAGNOSIS — Z85.828 HISTORY OF NONMELANOMA SKIN CANCER: ICD-10-CM

## 2018-12-17 DIAGNOSIS — L57.0 ACTINIC KERATOSES: ICD-10-CM

## 2018-12-17 DIAGNOSIS — Z80.8 FAMILY HISTORY OF MELANOMA: ICD-10-CM

## 2018-12-17 DIAGNOSIS — Z86.018 HISTORY OF DYSPLASTIC NEVUS: ICD-10-CM

## 2018-12-17 DIAGNOSIS — D23.30 DERMAL NEVUS OF FACE: ICD-10-CM

## 2018-12-17 PROCEDURE — 17000 DESTRUCT PREMALG LESION: CPT | Performed by: DERMATOLOGY

## 2018-12-17 PROCEDURE — 99213 OFFICE O/P EST LOW 20 MIN: CPT | Performed by: DERMATOLOGY

## 2018-12-17 PROCEDURE — 17003 DESTRUCT PREMALG LES 2-14: CPT | Performed by: DERMATOLOGY

## 2018-12-17 RX ORDER — NAPROXEN SODIUM 220 MG
220 TABLET ORAL 2 TIMES DAILY WITH MEALS
COMMUNITY

## 2018-12-18 ENCOUNTER — OFFICE VISIT (OUTPATIENT)
Dept: ORTHOPEDIC SURGERY | Age: 43
End: 2018-12-18
Payer: COMMERCIAL

## 2018-12-18 VITALS
DIASTOLIC BLOOD PRESSURE: 75 MMHG | WEIGHT: 135 LBS | HEIGHT: 64 IN | BODY MASS INDEX: 23.05 KG/M2 | HEART RATE: 65 BPM | SYSTOLIC BLOOD PRESSURE: 117 MMHG

## 2018-12-18 DIAGNOSIS — M17.11 PRIMARY OSTEOARTHRITIS OF RIGHT KNEE: Primary | ICD-10-CM

## 2018-12-18 PROCEDURE — 99213 OFFICE O/P EST LOW 20 MIN: CPT | Performed by: ORTHOPAEDIC SURGERY

## 2018-12-18 NOTE — PROGRESS NOTES
Chief Complaint    Follow-up (right knee, cortisone injection)      History of Present Illness:  Oren Cueva is a 37 y.o. female who presents for follow up of her right knee. The patient is a previous patient of Dr. Tiana Lance. After a complication following a procedure completed by an outside physician she fell under Dr. Tiana Lance care for the recovery of an artery injury followed by an IT band release and partial medial meniscectomy dating back to  and . This case was all handled by [facility]. Since that time she has also been seen by Dr. Deborah Cam for hip issues. The patient returns today for some occurring knee pain. The patient reports that around late April she started to notice some swelling pain in her knee. She notes feeling a burning sensation under her knee. This pain occurs all the time and is reported as a 6 out of 10 at worse and 2 our of 10 at best when she is at rest. She notes being able to continue with normal exercise and is able to complete exercise but notes having this constant pain. She does note having a cortisone injection from Dr. Deborah Cam back in September time and notes this did not help. The patient reports no new injuries at this time. Past Medical History:   Diagnosis Date    Endometriosis 0    H/O blood clots 2017    RT leg X 2  per PT     Headache         Past Surgical History:   Procedure Laterality Date    CERVICAL POLYP REMOVAL      CHOLECYSTECTOMY, LAPAROSCOPIC      LAPAROSCOPIC APPENDECTOMY         Family History   Problem Relation Age of Onset    Cancer Father         skin CA - type unsure per PT     Cancer Paternal Aunt         Melanoma  -  due to skin CA per PT    Cancer Sister         skin? 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       Social History Main Topics    Smoking status: Never Smoker    Smokeless tobacco: Never Used    Alcohol use 0.0 oz/week      Comment: rare    Drug use: No    Sexual activity: Yes     Partners: Male      Comment: Vika Christophe     Other Topics Concern    None     Social History Narrative    None       Current Outpatient Prescriptions   Medication Sig Dispense Refill    naproxen sodium (ALEVE) 220 MG tablet Take 220 mg by mouth 2 times daily (with meals)      diclofenac sodium 1 % GEL Apply 4 g topically 4 times daily 2 Tube 5    vitamin C (ASCORBIC ACID) 500 MG tablet Take 500 mg by mouth daily      Glucosamine-Chondroit-Vit C-Mn (GLUCOSAMINE 1500 COMPLEX PO) Take by mouth      norethindrone (AYGESTIN) 5 MG tablet Take 5 mg by mouth      ibuprofen (ADVIL;MOTRIN) 200 MG tablet Take 200 mg by mouth every 6 hours as needed for Pain      ferrous sulfate 325 (65 FE) MG tablet Take 325 mg by mouth daily (with breakfast)      amitriptyline (ELAVIL) 10 MG tablet Start with 1 tab by mouth nightly; may increase dose to 2, then 3 tabs nightly as tolerated. (Patient taking differently: Indications: ND for migraines per PT Start with 1 tab by mouth nightly; may increase dose to 2, then 3 tabs nightly as tolerated.) 90 tablet 3    levothyroxine (SYNTHROID) 50 MCG tablet Take 50 mcg by mouth      Prenatal Vit-Fe Fumarate-FA (PREPLUS) 27-1 MG TABS        No current facility-administered medications for this visit. Allergies   Allergen Reactions    Bee Venom     Penicillins     Mastisol Adhesive [Wound Dressing Adhesive] Swelling and Rash     Adhesive for steri-strips       Review of Systems:  A 14 point review of systems was completed by the patient and is available in the media section of the scanned medical record and was reviewed on 12/18/2018.   The review is negative with the exception of those things mentioned in the HPI and Past Medical History     Vital Signs:   /75   Pulse 65   Ht 5' 4\" (1.626 m)   Wt 135 lb (61.2 kg)   BMI 23.17 kg/m²     General

## 2020-01-03 ENCOUNTER — OFFICE VISIT (OUTPATIENT)
Dept: ORTHOPEDIC SURGERY | Age: 45
End: 2020-01-03
Payer: COMMERCIAL

## 2020-01-03 VITALS — HEIGHT: 64 IN | WEIGHT: 134.92 LBS | BODY MASS INDEX: 23.03 KG/M2

## 2020-01-03 PROCEDURE — 99214 OFFICE O/P EST MOD 30 MIN: CPT | Performed by: PHYSICAL MEDICINE & REHABILITATION

## 2020-01-03 NOTE — LETTER
[facility]  Surgery Precert & Billing Form:    DEMOGRAPHICS:                                                                                                       Patient Name:  Dianne King  Patient :  1975   Patient SS#:      Patient Phone:  [phone] (home) [phone] X3 (work) Alt.  Patient Phone:    Patient Address:  [address] [address]    PCP:  Jasmin Gary MD  Insurance: [facility]    DIAGNOSIS & PROCEDURE:                                                                                      Diagnosis: M51.27, A60.387  Operation: left L5-S1 TX NBA #1 NEW SERIES    SURGERY  INFORMATION  Date of Surgery:   20  Location:   [facility]  Type:    OUTPATIENT  23 hour hold:  NO  Surgeon:          Niesha Negron MD  1/3/20     BILLING INFORMATION:                                                                                                Physician Procedure                                            CPT Codes        LEFT L5-S1 TRANSFORAMINAL NBA #1  24396  NEW SERIES                PA, or Fellow Procedure                                      CPT Codes

## 2020-01-03 NOTE — PROGRESS NOTES
Follow up: SPINE    CHIEF COMPLAINT:    Chief Complaint   Patient presents with    Back Pain     OP/sp LBP       HISTORY OF PRESENT ILLNESS:                The patient is a 40 y.o. female known L5-S1 DDD last seen 2017 right lumbar epidural.  She reports more chronic left back pain worse over the last year gradually progressive. More frequent flareups with minimal activity. She like to be more physically active including exercise and rehab but feels her back pain stops her periods on her left back. No radiating leg pain. Again she feels the symptoms are similar to previous    I reviewed her old records showing we did a right lumbar epidural and she has displaced narrowing and bulging L5-S1      Pain Assessment  Location of Pain: Back  Severity of Pain: 3  Quality of Pain: Aching, Dull, Sharp, Throbbing  Duration of Pain: Persistent  Frequency of Pain: Intermittent  Aggravating Factors: Other (Comment)(RANDOM)  Limiting Behavior: Yes  Relieving Factors: Nsaids, Ice  Result of Injury: No  Work-Related Injury: No  Are there other pain locations you wish to document?: No      Past/Current Treatment     PT: HEP, past  Chiro:  Injections:   Medications:            NSAIDS: Past            Muscle relaxer:              Steriods:   Last            Neuropathic medications:              Opioids:            Other:, No  Surgery:     Past Medical History: Medical history form was reviewed and scanned into the Media tab  Past Medical History:   Diagnosis Date    Endometriosis 0    H/O blood clots 06/2017    RT leg X 2  per PT     Headache         REVIEW OF SYSTEMS:   CONSTITUTIONAL: Denies unexplained weight loss, fevers, chills or fatigue  NEUROLOGIC: Denies tremors or seizures         PHYSICAL EXAM:    Vitals: Height 5' 4.02\" (1.626 m), weight 134 lb 14.7 oz (61.2 kg), not currently breastfeeding. GENERAL EXAM:  · General Apparence: Patient is adequately groomed with no evidence of malnutrition. · Orientation:  The patient is oriented to time, place and person. · Mood & Affect:The patient's mood and affect are appropriate   · Vascular: Examination reveals no swelling tenderness in upper or lower extremities. · Lymphatic: The lymphatic examination bilaterally reveals all areas to be without enlargement or induration  · Sensation: Sensation is intact without deficit  · Coordination/Balance: Good coordination   ·   LUMBAR/SACRAL EXAMINATION:  · Inspection: Local inspection shows no step-off or bruising. Lumbar alignment is normal.  Sagittal and Coronal balance is neutral.      · Palpation:   No evidence of tenderness at the midline. No tenderness bilaterally at the paraspinal or trochanters. There is no step-off or paraspinal spasm. · Range of Motion: Moderate loss flexion extension  · Strength:   Strength testing is 5/5 in all muscle groups tested. · Special Tests:   Straight leg raise and crossed SLR negative. Leg length and pelvis level.  0 out of 5 Bernard's signs. · Skin: There are no rashes, ulcerations or lesions. · Reflexes: Reflexes are symmetrically 2+ at the patellar and ankle tendons. Clonus absent bilaterally at the feet. · Gait & station: Normal gait additional Examinations:   ·   · RIGHT LOWER EXTREMITY: Inspection/examination of the right lower extremity does not show any tenderness, deformity or injury. Range of motion is full. There is no gross instability. There are no rashes, ulcerations or lesions. Strength and tone are normal.  · LEFT LOWER EXTREMITY:  Inspection/examination of the left lower extremity does not show any tenderness, deformity or injury. Range of motion is full. There is no gross instability. There are no rashes, ulcerations or lesions.   Strength and tone are normal.    Diagnostic Testin/3/2020 views lumbar spine show advanced to space narrowing L5-S1    MRI report 2017 reviewed shows central disc protrusion moderate central stenosis    Impression:    L5-S1 DDD,

## 2020-01-03 NOTE — LETTER
[facility]    Please Schedule the following with: Dr. Gopi Jacobs    Date:  1/3/20     Patient: Jeanette Mensah     YOB: 1975    Patient Home Phone: [phone] (home)    Diagnosis: L5-S1 central HNP M51.27, moderate central stenosis M48.062    [x]LT     []RT     []CASIE     []Midline    Levels: left S1 transforaminal epidural, #1 new series. []Cervical NBA [id number], [id number]  []L-MBB [id number], [id number]  []SI Joint [id number]   []C-FACET [id number], [id number], G826614  []L-FACET [id number], [id number]  []Interlaminar NBA [id number]     []HIP 54346    []C-MBB  []Transforaminal NBA 94857  []Neurotomy 79997, 92202, 81719    Attending Physician: Satish Borrero    Injection Schedule for:1/14/2020 AT 12 NOON      At: [facility]    First Insurance: Sofía Self #:  Second Insurance:                 Pre-cert #:    Comments: 6933 right L5 transforaminal epidural with good relief    SEDATION:       [] IV           [] ORAL    [] Blood Thinner:                 []Diabetic           []Antibiotic:               []Glaucoma:    [] Pacemaker/defib       [] Current Open Wounds, Lacerations or Sores     Allergies:    Allergies   Allergen Reactions    Bee Venom Swelling    Hornet Venom Swelling and Other (See Comments)     Bees/Wasps      Wound Dressing Adhesive Swelling and Rash     Adhesive for steri-strips        Penicillins     Venlafaxine Other (See Comments)     Worsening anxiety         Past Medical History:   Diagnosis Date    Endometriosis 1998    H/O blood clots 06/2017    RT leg X 2  per PT     Headache         Current Outpatient Medications   Medication Sig Dispense Refill    naproxen sodium (ALEVE) 220 MG tablet Take 220 mg by mouth 2 times daily (with meals)      diclofenac sodium 1 % GEL Apply 4 g topically 4 times daily 2 Tube 5    vitamin C (ASCORBIC ACID) 500 MG tablet Take 500 mg by mouth daily

## 2020-01-09 ENCOUNTER — TELEPHONE (OUTPATIENT)
Dept: ORTHOPEDIC SURGERY | Age: 45
End: 2020-01-09

## 2020-01-09 NOTE — TELEPHONE ENCOUNTER
DOS   01/14/2020  CPT   51618  DX   M51.27    M48.062  OP SX AUTH  [id number]  VALID   01/14/2020 - 01/28/2020    LEFT  LEVELS   L5 - S1   PROCEDURE   TRANS FORAMINAL NBA  DR.  JONELLE  MERCY  SIOBHAN  INSURANCE:   [facility]

## 2020-01-14 ENCOUNTER — HOSPITAL ENCOUNTER (OUTPATIENT)
Age: 45
Setting detail: OUTPATIENT SURGERY
Discharge: HOME | End: 2020-01-14
Attending: PHYSICAL MEDICINE & REHABILITATION | Admitting: PHYSICAL MEDICINE & REHABILITATION
Payer: COMMERCIAL

## 2020-01-14 VITALS
DIASTOLIC BLOOD PRESSURE: 76 MMHG | OXYGEN SATURATION: 100 % | SYSTOLIC BLOOD PRESSURE: 110 MMHG | TEMPERATURE: 98.1 F | RESPIRATION RATE: 12 BRPM | HEART RATE: 65 BPM | WEIGHT: 137 LBS | HEIGHT: 64 IN | BODY MASS INDEX: 23.39 KG/M2

## 2020-01-14 LAB — PREGNANCY, URINE: NEGATIVE

## 2020-01-14 PROCEDURE — 7100000010 HC PHASE II RECOVERY - FIRST 15 MIN: Performed by: PHYSICAL MEDICINE & REHABILITATION

## 2020-01-14 PROCEDURE — 6360000004 HC RX CONTRAST MEDICATION: Performed by: PHYSICAL MEDICINE & REHABILITATION

## 2020-01-14 PROCEDURE — 3600000012 HC SURGERY LEVEL 2 ADDTL 15MIN: Performed by: PHYSICAL MEDICINE & REHABILITATION

## 2020-01-14 PROCEDURE — 2500000003 HC RX 250 WO HCPCS: Performed by: PHYSICAL MEDICINE & REHABILITATION

## 2020-01-14 PROCEDURE — 2709999900 HC NON-CHARGEABLE SUPPLY: Performed by: PHYSICAL MEDICINE & REHABILITATION

## 2020-01-14 PROCEDURE — 84703 CHORIONIC GONADOTROPIN ASSAY: CPT

## 2020-01-14 PROCEDURE — 6360000002 HC RX W HCPCS: Performed by: PHYSICAL MEDICINE & REHABILITATION

## 2020-01-14 PROCEDURE — 3600000002 HC SURGERY LEVEL 2 BASE: Performed by: PHYSICAL MEDICINE & REHABILITATION

## 2020-01-14 RX ORDER — LIDOCAINE HYDROCHLORIDE 10 MG/ML
INJECTION, SOLUTION EPIDURAL; INFILTRATION; INTRACAUDAL; PERINEURAL PRN
Status: DISCONTINUED | OUTPATIENT
Start: 2020-01-14 | End: 2020-01-14 | Stop reason: ALTCHOICE

## 2020-01-14 ASSESSMENT — PAIN - FUNCTIONAL ASSESSMENT
PAIN_FUNCTIONAL_ASSESSMENT: 0-10
PAIN_FUNCTIONAL_ASSESSMENT: PREVENTS OR INTERFERES SOME ACTIVE ACTIVITIES AND ADLS

## 2020-01-14 ASSESSMENT — PAIN DESCRIPTION - DESCRIPTORS: DESCRIPTORS: ACHING;DULL;SHARP

## 2020-01-29 ENCOUNTER — OFFICE VISIT (OUTPATIENT)
Dept: ORTHOPEDIC SURGERY | Age: 45
End: 2020-01-29
Payer: COMMERCIAL

## 2020-01-29 VITALS — WEIGHT: 136.91 LBS | BODY MASS INDEX: 23.37 KG/M2 | HEIGHT: 64 IN

## 2020-01-29 PROCEDURE — 99212 OFFICE O/P EST SF 10 MIN: CPT | Performed by: PHYSICAL MEDICINE & REHABILITATION

## 2020-01-29 NOTE — PROGRESS NOTES
bilaterally reveals all areas to be without enlargement or induration  · Sensation: Sensation is intact without deficit  · Coordination/Balance: Good coordination   ·   LUMBAR/SACRAL EXAMINATION:  · Inspection: Local inspection shows no step-off or bruising. Lumbar alignment is normal.  Sagittal and Coronal balance is neutral.      · Palpation: Under palpation left sacroiliac joint range of Motion: Moderate loss flexion extension  · Strength:   Strength testing is 5/5 in all muscle groups tested. · Special Tests: Shelton's finger test positive on the left  · Reflexes: Reflexes are symmetrically 2+ at the patellar and ankle tendons. Clonus absent bilaterally at the feet. · Gait & station: Normal gait additional Examinations:   ·   · RIGHT LOWER EXTREMITY: Inspection/examination of the right lower extremity does not show any tenderness, deformity or injury. Range of motion is full. There is no gross instability. There are no rashes, ulcerations or lesions. Strength and tone are normal.  · LEFT LOWER EXTREMITY:  Inspection/examination of the left lower extremity does not show any tenderness, deformity or injury. Range of motion is full. There is no gross instability. There are no rashes, ulcerations or lesions.   Strength and tone are normal.    Diagnostic Testin/3/2020 views lumbar spine show advanced to space narrowing L5-S1    MRI report 2017 reviewed shows central disc protrusion moderate central stenosis    Impression:    Improved back left buttock pain after recent epidural  Contributing left sacroiliac pain  L5-S1 DDD, central stenosis, central HNP, chronic left back pain      Plan:    Lumbar flexion-based HEP program given    If symptoms persist or worsen directly schedule left intra-articular sacroiliac injection    Continue PRN DIO Campos

## 2020-02-25 ENCOUNTER — TELEPHONE (OUTPATIENT)
Dept: DERMATOLOGY | Age: 45
End: 2020-02-25

## 2020-02-27 ENCOUNTER — OFFICE VISIT (OUTPATIENT)
Dept: DERMATOLOGY | Age: 45
End: 2020-02-27
Payer: COMMERCIAL

## 2020-02-27 PROCEDURE — 99214 OFFICE O/P EST MOD 30 MIN: CPT | Performed by: DERMATOLOGY

## 2020-04-07 ENCOUNTER — TELEPHONE (OUTPATIENT)
Dept: DERMATOLOGY | Age: 45
End: 2020-04-07

## 2020-04-07 NOTE — TELEPHONE ENCOUNTER
Patient has 2 spots on her face that she thought were dry spots. She is using aquaphor but they are not going away. The spots have been there for one week with no improvement. Red flat dry spots. She has a prescription of an antibiotic cream, should she try that? Should she schedule a virtual visit for this or wait until September? [phone]    Pharmacy is [facility] at [address] in [address] [place] [address]

## 2020-06-04 ENCOUNTER — TELEPHONE (OUTPATIENT)
Dept: DERMATOLOGY | Age: 45
End: 2020-06-04

## 2020-06-23 NOTE — PROGRESS NOTES
and Other (See Comments)     Bees/Wasps      Wound Dressing Adhesive Swelling and Rash     Adhesive for steri-strips        Venlafaxine Other (See Comments)     Worsening anxiety       Outpatient Medications Marked as Taking for the 6/26/20 encounter (Office Visit) with Mikie Jewell MD   Medication Sig Dispense Refill    Doxylamine Succinate, Sleep, (SLEEP AID PO) Take by mouth      naproxen sodium (ALEVE) 220 MG tablet Take 220 mg by mouth 2 times daily (with meals)      diclofenac sodium 1 % GEL Apply 4 g topically 4 times daily 2 Tube 5    vitamin C (ASCORBIC ACID) 500 MG tablet Take 500 mg by mouth daily      norethindrone (AYGESTIN) 5 MG tablet Take 5 mg by mouth      ibuprofen (ADVIL;MOTRIN) 200 MG tablet Take 200 mg by mouth every 6 hours as needed for Pain      ferrous sulfate 325 (65 FE) MG tablet Take 325 mg by mouth daily (with breakfast)      levothyroxine (SYNTHROID) 50 MCG tablet Take 50 mcg by mouth         Physical Examination     The following were examined and determined to be normal: Psych/Neuro, Scalp/hair, Conjunctivae/eyelids, Gums/teeth/lips, Neck, Nails/digits and Genitalia/groin/buttocks. The following were examined and determined to be abnormal: Head/face, Breast/axilla/chest, Abdomen, Back, RUE, LUE, RLE and LLE. -General: Well-appearing, NAD  1. Scattered on the trunk and extremities are multiple well-defined round and oval symmetric smoothly-bordered uniformly brown macules and papules. 1a. R medial heel - 6mm asymmetric medium/dark brown macule       2. L temple 1, nasal bridge 2, L 4 and R 1 cheeks - ill-defined irregularly-shaped roughly-scaling thin pink macule(s)/papule(s)   3. R superior helix - scar clear     Assessment and Plan     1.  Benign acquired melanocytic nevi / hx dysplastic nevi / family hx melanoma   -Recommend monthly self skin exams   -Educated regarding the ABCDEs of melanoma detection   -Call for any new/changing moles or concerning lesions  -Reviewed sun protective behavior -- sun avoidance during the peak hours of the day, sun-protective clothing (including hat and sunglasses), sunscreen use (water resistant, broad spectrum, SPF at least 30, need for reapplication every 2 to 3 hours), avoidance of tanning beds     1a. Neoplasm of uncertain behavior of skin - R/o dysplastic nevus, R medial heel   -Discussed possible diagnosis. Patient agreeable to biopsy. Verbal consent obtained after risks (infection, bleeding, scar), benefits and alternatives explained. -Area(s) to be biopsied were marked with a surgical pen. Site(s) were cleansed with alcohol. Local anesthesia achieved with 1% lidocaine with epinephrine/sodium bicarbonate. Shave biopsy performed with a razor blade. Hemostasis was achieved with aluminum chloride. The wound(s) were dressed with petrolatum and covered with a bandage. Specimen(s) sent to pathology. Pt educated re: risk of bleeding, infection, scar and wound care instructions. 2. Actinic keratosis(es)  -Edu re: relationship with chronic cumulative sun exposure, low premalignant potential.   -8 lesion(s) treated w/ liquid nitrogen x 2 cycles - L temple 1, nasal bridge 2, L 4 and R 1 cheeks. Edu re: risk of blister formation, discomfort, scar, hypopigmentation. Discussed wound care.         3. History of NMSC - clear today  -Full skin exam in 1yr (sooner if indicated)

## 2020-06-23 NOTE — PATIENT INSTRUCTIONS
rubbing alcohol or hydrogen peroxide.  Continue this regimen until the area is pink and healed. Depending on the size and location of your cryosurgery site, healing may take 2 to 4 weeks.  The area may continue to be pink for several weeks, and over the next few months may become darker or lighter than the surrounding skin. This may be a permanent change. Biopsy Wound Care Instructions    · Keep the bandage in place for 24 hours. · Cleanse the wound with mild soapy water daily   Gently dry the area.  Apply Vaseline or petroleum jelly to the wound using a cotton tipped applicator.  Cover with a clean bandage.  Repeat this process until the biopsy site is healed.  If you had stitches placed, continue treating the site until the stitches are removed. Remember to make an appointment to return to have your stitches removed by our staff.  You may shower and bathe as usual.       ** Biopsy results generally take around 7 business days to come back. If you have not heard from us by then, please call the office at [phone] between 8AM and 4PM Monday through Friday.

## 2020-06-26 ENCOUNTER — OFFICE VISIT (OUTPATIENT)
Dept: DERMATOLOGY | Age: 45
End: 2020-06-26
Payer: COMMERCIAL

## 2020-06-26 VITALS — TEMPERATURE: 97.5 F

## 2020-06-26 PROCEDURE — 11102 TANGNTL BX SKIN SINGLE LES: CPT | Performed by: DERMATOLOGY

## 2020-06-26 PROCEDURE — 99213 OFFICE O/P EST LOW 20 MIN: CPT | Performed by: DERMATOLOGY

## 2020-06-26 PROCEDURE — 17003 DESTRUCT PREMALG LES 2-14: CPT | Performed by: DERMATOLOGY

## 2020-06-26 PROCEDURE — 17000 DESTRUCT PREMALG LESION: CPT | Performed by: DERMATOLOGY

## 2020-06-30 ENCOUNTER — TELEPHONE (OUTPATIENT)
Dept: DERMATOLOGY | Age: 45
End: 2020-06-30

## 2020-06-30 NOTE — TELEPHONE ENCOUNTER
Jo Main c/b [phone]  Jo Main states:   - calling from [facility]   - running special stains    - will be delayed  Please call to discuss if you have any questions

## 2020-07-02 NOTE — TELEPHONE ENCOUNTER
Jacque calling from [facility] c/b [phone]  Mark Powers states:   - Dr. Brianne Garcia is needing to speak with Dr. Belen Schwartz regarding current biopsy   - phone are on until 5 pm  Please call to discuss thanks

## 2020-07-06 LAB — DERMATOLOGY PATHOLOGY REPORT: ABNORMAL

## 2020-07-06 NOTE — TELEPHONE ENCOUNTER
Spoke w/ pt and discussed above. Referral for Dr. Chel Sanchez placed. Kandy, can you call pt to schedule FSE in 6mo?

## 2020-07-06 NOTE — TELEPHONE ENCOUNTER
Dr. Oriana Anne patient    Patient calling you back in regards to her biopsy results    Call back # [phone]

## 2020-07-06 NOTE — TELEPHONE ENCOUNTER
6/26/20 R medial heel biopsy result - consistent w/ early melanoma in situ    Plan: Refer to Dr. Tamika Bowen for excision   -FSE q6mo x 5yrs     LMOM for pt to call me back to discuss biopsy result.

## 2020-07-07 ENCOUNTER — PROCEDURE VISIT (OUTPATIENT)
Dept: SURGERY | Age: 45
End: 2020-07-07
Payer: COMMERCIAL

## 2020-07-07 ENCOUNTER — TELEPHONE (OUTPATIENT)
Dept: SURGERY | Age: 45
End: 2020-07-07

## 2020-07-07 VITALS — TEMPERATURE: 97.7 F

## 2020-07-07 PROBLEM — D03.71 MELANOMA IN SITU OF RIGHT LOWER EXTREMITY (HCC): Status: ACTIVE | Noted: 2020-07-07

## 2020-07-07 PROCEDURE — 11622 EXC S/N/H/F/G MAL+MRG 1.1-2: CPT | Performed by: DERMATOLOGY

## 2020-07-07 NOTE — PROGRESS NOTES
PRE-PROCEDURE SCREENING    Pacemaker/ICD: No  Difficulty with numbing in the past: No  Local Anesthesia Reaction/passing out: No  Latex or adhesive allergy:  Allergy to mastisol   Bleeding/Clotting Disorders: No  Anticoagulant Therapy: No  Joint prosthesis: No  Artificial Heart Valve: No  Stroke or Seizures: No  Organ Transplant or Lymphoma: No  Immunosuppression: No  Respiratory Problems: No

## 2020-07-07 NOTE — PROGRESS NOTES
The patient was counseled at length about the risks of checo Covid-19 during their perioperative period and any recovery window from their procedure. The patient was made aware that checo Covid-19  may worsen their prognosis for recovering from their procedure  and lend to a higher morbidity and/or mortality risk. All material risks, benefits, and reasonable alternatives including postponing the procedure were discussed. The patient does wish to proceed with the procedure at this time. EXCISION OPERATIVE PROCEDURE NOTE    SURGEON: Azalea Barbosa. Ayala Baptiste MD    ASSISTANT:  Jeff Norton LPN    REFERRING PROVIDER:  Alejandro Simpson MD    PREOPERATIVE DIAGNOSIS: Melanoma in situ    POSTOPERATIVE DIAGNOSIS: SAME. OPERATIVE PROCEDURE: EXCISION    RECONSTRUCTION OF DEFECT: Second Intention Wound Healing    LOCATION: Right medial heel     SIZE OF LESION: 7x6 MM     SIZE OF LESION PLUS CIRCUMFERENTIAL MARGIN: 15x14 MM     FINAL REPAIR LENGTH:  15x14 MM    ANESTHESIA:5 CC XYLOCAINE 1% WITH EPINEPHRINE 1:100,000, BUFFERED. DURATION OF PROCEDURE: 15 MINUTES     POSTOPERATIVE OBSERVATION: 20 MINUTES     EBL: MINIMAL. SPECIMENS: 1    COMPLICATIONS: NONE     DESCRIPTION OF PROCEDURE: The patient was given a mirror and the biopsy site was identified, marked with surgical marking pen, and verified with the patient. Written consent was obtained. There was a time out for person and procedure verification. The operative site was cleansed with Chlorhexidine gluconate 2% with isopropylalcohol 70%, then cleaned off, dried, and draped sterilely. The lesion was excised in a disc design down to subcutaneous fat with 4 mm margins. Hemostasis was achieved with electrosurgery. DEFECT MANAGEMENT:  After discussion with the patient regarding the various options, it was decided to allow the defect to heal by second intention (granulation).   Healing time, wound care and scarring were discussed with the patient and he/she feels capable of taking care of the wound. The wound was cleaned with normal saline solution, dried off, Aquaphor ointment was applied, and the wound was covered. A dressing was applied for stabilization and light pressure. The patient was given detailed oral and written instructions on postoperative care. There were no complications. The patient left the Unit in good medical condition and was scheduled to return for suture removal/wound check in 14-21 days.

## 2020-07-07 NOTE — TELEPHONE ENCOUNTER
Patient was asked following questions for screening.      Fever >100.0 F or >99.9 F in immunocompromised patients:  No  New onset of cough:  No  New onset shortness of breath:  No  New onset difficulty breathing:  NO  New onset sore throat:  No  New onset of muscle pain:  No  New onset of severe headache:  No  New onset of loss of taste or smell:  No  New onset diarrhea/vomiting:  No      Reviewed office protocol per covid guidelines with patient. Completed at appt time.

## 2020-07-07 NOTE — PATIENT INSTRUCTIONS
[facility] Office Hours:    Monday-Thursday  7:30 AM-4:30 PM    Friday  9:00 AM-1:00 PM    DAILY WOUND CARE-SECOND INTENTION    1.  Keep the area absolutely dry for 24 to 48 hours. -After 24 to 48 hours you may remove the bandage and shower. 2.  Remove the bandage and clean the wound with mild soap and water. Try to clean off crust and debris. -After one week, you may rub the surface of the wound fairly aggressively (a small amount of bleeding is normal when you do this). It is important not to allow a scab to form as scabs slow down the healing process. Dry the area with a clean Q-tip or gauze. 3.  Apply a layer of Vaseline (or Bacitracin if your doctor recommends) to the wound area  only. 4.  Cut a piece of Telfa (or any non-stick dressing) to fit just over the wound and secure it with paper tape. If the wound is small you may use a Band-Aid    You should continue daily wound care and keep a bandage on until new skin has grown over the entire wound    ? Bleeding: If bleeding occurs, DO NOT remove the bandage. Put firm pressure on the area with gauze for 20 minutes without peeking. If the bleeding continues, apply pressure for 20 minutes more. ? If the bleeding does not stop after you apply pressure, call us right away. If you cant call, go to the nearest emergency room or urgent care facility. POST-OPERATIVE INSTRUCTIONS     1. Activity: Do not lift anything heavier than a gallon of milk for 1 week. Also, avoid strenuous activity such as running, power walking or contact sports. 2.  Eating and drinking: Do not drink alcohol for 48 hours after your procedure. Alcohol increases the chances of bleeding. 3.  Medicines:  -If you have discomfort, take acetaminophen (Tylenol or Extra Strength Tylenol). Follow the instructions and warning on the bottle. -If your doctor has prescribed you an aspirin daily, please keep taking it.  Do not take extra aspirin or medicines containing aspirin (such as [place] and Excedrin) for 48 hours after your procedure. 4.  Symptoms: You may have these symptoms. They are normal and should get better with time:  A. Swelling. Swelling usually increases for 24 to 48 hours after your procedure and then begins to improve. B.  Some soreness and redness around your wound. C.  Bruising which can last for up to 2 weeks    WHAT TO EXPECT FOR THE COMING WEEKS TO MONTHS  1. You may use make-up once the area is well healed. 2.  Vitamin E oil is NOT necessary. A good moisturizer is just as effective. 3.  Sunscreen IS necessary. Use at least an SPF 30 sunscreen daily- even in the winter.    -Scars take from 6 months to 1 year to fully mature. After the area has healed, it may be helpful to gently massage the area with a moisturizer, petroleum jelly (Vaseline) or Aquaphor. This helps to soften the scar tissue.   -The color of the scar will even out over time, but may remain pink for several months. Swelling may also remain for several months, especially if the area is on the legs.       Call us at [phone] right away if you have any of the following symptoms:   Bleeding that you cant stop (see above)   Pain that lasts longer than 48 hours   Your wound becomes more painful, red or hot   Bruising and swelling that does not improve within 48 hours or gets worse suddenly

## 2020-07-08 ENCOUNTER — TELEPHONE (OUTPATIENT)
Dept: SURGERY | Age: 45
End: 2020-07-08

## 2020-07-08 NOTE — TELEPHONE ENCOUNTER
The patient was in the office on 7/7/20 for excisional surgery located on the right medial heel with second intention wound healing repair. The patient tolerated the procedure well and left the office in good condition. Pain level on post-operative day 1:  present - adequately treated (took ibuprofen once and says pain at this point is manageable without medication)    Any bleeding episode that required pressure to be held, bandage change or a call to the office or MD?  No, slight bleeding yesterday afternoon that did not result in bandage change, pt will change bandage on Thursday as agreed upon. Any other issues?:  no    A post-operative telephone call was placed at 11:42AM in order to check on the patient's recovery process. The patient reported doing well and had no complaints other than those listed above, if any. All of the patient's questions were answered.

## 2020-07-09 ENCOUNTER — TELEPHONE (OUTPATIENT)
Dept: SURGERY | Age: 45
End: 2020-07-09

## 2020-07-09 NOTE — TELEPHONE ENCOUNTER
----- Message from Chet Sunshine MD sent at 7/9/2020 11:22 AM EDT -----  Clear margins.  No further tx necessary

## 2020-07-09 NOTE — TELEPHONE ENCOUNTER
I reviewed results of the biopsy with the patient. Date of excision: 07/07/2020  Site of excision: Right medial heel  Result: No residual atypical melanocytic hyperplasia (melanoma in situ) within the re-excised tissue    Plan: Clear margins. No further treatment necessary. The patient expressed understanding of the plan.

## 2020-07-14 ENCOUNTER — TELEPHONE (OUTPATIENT)
Dept: SURGERY | Age: 45
End: 2020-07-14

## 2020-07-21 ENCOUNTER — TELEPHONE (OUTPATIENT)
Dept: SURGERY | Age: 45
End: 2020-07-21

## 2020-07-21 NOTE — TELEPHONE ENCOUNTER
Returned pt call regarding bandage questions. Pt states drainage from site is serosanguinous. States no pain or heat and says otherwise site looks good. Requested that pt send photo to my email account to show MD and schedule follow up appointment if necessary. Informed pt that it is okay to swim in pool water if she washes wound after swimming. Will reach out after photos have been reviewed.

## 2020-07-21 NOTE — TELEPHONE ENCOUNTER
Pt called stating that it looks fine but when she's wrapping it but when she puts on a sock it's sweeping through. Please call to advise. She wants to know if she can swim this weekend as well.

## 2020-10-30 ENCOUNTER — OFFICE VISIT (OUTPATIENT)
Dept: ORTHOPEDIC SURGERY | Age: 45
End: 2020-10-30
Payer: COMMERCIAL

## 2020-10-30 VITALS — HEIGHT: 60 IN | WEIGHT: 136 LBS | BODY MASS INDEX: 26.7 KG/M2

## 2020-10-30 PROCEDURE — 99214 OFFICE O/P EST MOD 30 MIN: CPT | Performed by: PHYSICAL MEDICINE & REHABILITATION

## 2020-10-30 PROCEDURE — 20552 NJX 1/MLT TRIGGER POINT 1/2: CPT | Performed by: PHYSICAL MEDICINE & REHABILITATION

## 2020-10-30 RX ORDER — METHYLPREDNISOLONE ACETATE 40 MG/ML
40 INJECTION, SUSPENSION INTRA-ARTICULAR; INTRALESIONAL; INTRAMUSCULAR; SOFT TISSUE ONCE
Status: COMPLETED | OUTPATIENT
Start: 2020-10-30 | End: 2020-10-30

## 2020-10-30 RX ADMIN — METHYLPREDNISOLONE ACETATE 40 MG: 40 INJECTION, SUSPENSION INTRA-ARTICULAR; INTRALESIONAL; INTRAMUSCULAR; SOFT TISSUE at 09:17

## 2020-10-30 NOTE — PROGRESS NOTES
Follow up: SPINE    CHIEF COMPLAINT:    Chief Complaint   Patient presents with    Back Pain     fu back having pain, requesting inj       HISTORY OF PRESENT ILLNESS:                The patient is a 39 y.o. female known L5-S1 DDD aggravation of left back buttock pain she had a left S1 transforaminal epidural with moderate relief in January 2020. She had reaggravation while doing band exercises in her left low back. We had initially discussed possibly left sacroiliac injection she would like to consider that. She is recently used diclofenac gel    Pain Assessment  Location of Pain: Back  Severity of Pain: 5  Quality of Pain: Aching  Duration of Pain: Persistent  Frequency of Pain: Constant  Aggravating Factors: Other (Comment)  Limiting Behavior: Yes  Relieving Factors: Other (Comment)  Result of Injury: No  Work-Related Injury: No  Are there other pain locations you wish to document?: No      Past/Current Treatment     PT: HEP, past  Chiro:  Injections: 4/25/17: Rt L5/S1 TX [facility]    1/14/2020 Left S1 transforaminal epidural injection  Medications:            NSAIDS: Past            Muscle relaxer:              Steriods:   Last            Neuropathic medications:              Opioids:            Other:, No  Surgery:     Past Medical History: Medical history form was reviewed and scanned into the Media tab  Past Medical History:   Diagnosis Date    Endometriosis 1998    H/O blood clots 06/2017    RT leg X 2  per PT     Headache         REVIEW OF SYSTEMS:   CONSTITUTIONAL: Denies unexplained weight loss, fevers, chills or fatigue  NEUROLOGIC: Denies tremors or seizures         PHYSICAL EXAM:    Vitals: Height 5' (1.524 m), weight 136 lb (61.7 kg), not currently breastfeeding. GENERAL EXAM:  · General Apparence: Patient is adequately groomed with no evidence of malnutrition. · Orientation: The patient is oriented to time, place and person.    · Mood & Affect:The patient's mood and affect are appropriate · Vascular: Examination reveals no swelling tenderness in upper or lower extremities. · Lymphatic: The lymphatic examination bilaterally reveals all areas to be without enlargement or induration  · Sensation: Sensation is intact without deficit  · Coordination/Balance: Good coordination   ·   LUMBAR/SACRAL EXAMINATION:  · Inspection: Local inspection shows no step-off or bruising. Lumbar alignment is normal.  Sagittal and Coronal balance is neutral.      · Palpation: She is more tender of the left lower lumbar paraspinals with trigger point palpable around L5 she is not directly tender over the left sacroiliac motion: Moderate loss flexion extension  · Strength:   Strength testing is 5/5 in all muscle groups tested. · Special Tests: Shelton's finger test positive on the left  · Reflexes: Reflexes are symmetrically 2+ at the patellar and ankle tendons. Clonus absent bilaterally at the feet. · Gait & station: Normal gait additional Examinations:   ·   · RIGHT LOWER EXTREMITY: Inspection/examination of the right lower extremity does not show any tenderness, deformity or injury. Range of motion is full. There is no gross instability. There are no rashes, ulcerations or lesions. Strength and tone are normal.  · LEFT LOWER EXTREMITY:  Inspection/examination of the left lower extremity does not show any tenderness, deformity or injury. Range of motion is full. There is no gross instability. There are no rashes, ulcerations or lesions.   Strength and tone are normal.    Diagnostic Testin/3/2020 views lumbar spine show advanced to space narrowing L5-S1    MRI report 2017 reviewed shows central disc protrusion moderate central stenosis    Impression:    Acute myofascial back pain  Improved back left buttock pain after recent epidural  Contributing left sacroiliac pain  L5-S1 DDD, central stenosis, central HNP, chronic left back pain      Plan: Clinical course was worse    We discussed limiting some of her band

## 2020-12-14 NOTE — PROGRESS NOTES
[facility]) Dermatology  Wendy Garner MD  [phone]      Dada Maurice  1975    39 y.o. female     Date of Visit: 12/17/2020    Last Visit: 6mo    Chief Complaint: Skin check    History of Present Illness:  1. Here for skin check. 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   -2006 severely dysplastic nevus, R mid arm - excised   -2006 moderately dysplastic nevus, R upper arm - excised  -2007 moderately dysplastic nevus, L medial chest - excised  -2009 moderately dysplastic nevuss, R lower back - excised  -(+) family hx melanoma - paternal aunt    -No new moles. No moles changing in size, shape, color. No moles associated w/ pain, bleeding, pruritus.   -Stays out of sun as much as possible. Wears SPF 30+ sunscreen regularly. 2. History of actinic keratoses s/p cryotherapy. Reports rough lesions on face    3. 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 Derm History:   -Hx genital HSV (R buttock) - previously treated w/ famvir  -Hx perioral dermatitis - treated w/ tetracycline, PO erythromycin and metrocream  -Mild ET/PP rosacea - metrogel prn   -Hx acute paronychia, R index finger 6/2020    Review of Systems:  Constitutional: Reports general sense of well-being. Skin: No interval severe sunburns. Allergies: Reviewed and updated. Past Medical History (notable for recent DVT on xarelto), Surgical History, Medications and Allergies reviewed.      Past Medical History:   Diagnosis Date    Endometriosis 0    H/O blood clots 06/2017    RT leg X 2  per PT     Headache      Past Surgical History:   Procedure Laterality Date    CERVICAL POLYP REMOVAL      CHOLECYSTECTOMY, LAPAROSCOPIC  1998    LAPAROSCOPIC APPENDECTOMY  1998    PAIN MANAGEMENT PROCEDURE Left 1/14/2020    LEFT LUMBAR SACRAL ONE EPIDURAL STEROID INJECTION SITE CONFIRMED BY FLUOROSCOPY performed by Guillermina Muñoz MD at [facility]   Allergen Reactions    Bee Venom Swelling    Hornet Venom Swelling and Other (See Comments)     Bees/Wasps      Mastisol Adhesive [Wound Dressing Adhesive] Swelling and Rash     Adhesive for steri-strips  Rash      Venlafaxine Other (See Comments)     Worsening anxiety       Outpatient Medications Marked as Taking for the 12/17/20 encounter (Office Visit) with Yane Funes MD   Medication Sig Dispense Refill    Doxylamine Succinate, Sleep, (SLEEP AID PO) Take by mouth      naproxen sodium (ALEVE) 220 MG tablet Take 220 mg by mouth 2 times daily (with meals)      diclofenac sodium 1 % GEL Apply 4 g topically 4 times daily 2 Tube 5    vitamin C (ASCORBIC ACID) 500 MG tablet Take 500 mg by mouth daily      norethindrone (AYGESTIN) 5 MG tablet Take 5 mg by mouth      ibuprofen (ADVIL;MOTRIN) 200 MG tablet Take 200 mg by mouth every 6 hours as needed for Pain      ferrous sulfate 325 (65 FE) MG tablet Take 325 mg by mouth daily (with breakfast)      levothyroxine (SYNTHROID) 50 MCG tablet Take 50 mcg by mouth         Physical Examination     The following were examined and determined to be normal: Psych/Neuro, Scalp/hair, Conjunctivae/eyelids, Gums/teeth/lips, Neck, Nails/digits and Genitalia/groin/buttocks. The following were examined and determined to be abnormal: Head/face, Breast/axilla/chest, Abdomen, Back, RUE, LUE, RLE and LLE. -General: Well-appearing, NAD  1. R medial heel - linear surgical scar. No papules, nodularity or dyspigmentation appreciated. -Scattered on the trunk and extremities are multiple well-defined round and oval symmetric smoothly-bordered uniformly brown macules and papules.   -Negative cervical, axillary, inguinal lymphadenopathy. Negative hepatosplenomegaly. 2. R 1 and L 2 temples, nasal bridge 1, L nasal sidewall 1, L upper cheek 1 - ill-defined irregularly-shaped roughly-scaling thin pink macule(s)/papule(s)   3. R superior helix - scar clear     Assessment and Plan     1.  Hx melanoma in

## 2020-12-14 NOTE — PATIENT INSTRUCTIONS
Protecting Yourself From the Sun    · Apply broad spectrum water resistant sunscreen with an SPF of at least 30 to exposed areas of the skin. Dont forget the ears and lips! Remember to reapply sunscreen about every 2 hours and after swimming or sweating. · Wear sun protective clothing. Swim shirts (aka. rash guards) are a great idea and negates the need to reapply sunscreen in those areas. · Seek the shade whenever possible especially between the hours of 10 am and 4 pm when the suns rays are the strongest.     · Avoid tanning beds       Cryosurgery (Freezing) Wound Care Instructions    AFTER THE PROCEDURE:   ? You will notice swelling and redness around the site. This is normal.   ? You may experience a sharp or sore feeling for the next several days. For this discomfort, you may take acetaminophen (Tylenol©). ? A blister may develop at the treated area, sometimes as soon as by the end of the day. After several days, the blister will subside and a scab will form. ? If the area is bumped or traumatized during the first few days following freezing, you may develop bleeding into the blister, forming a blood blister. This is nothing to be alarmed about. ? If the blister is tense, uncomfortable, or much larger than the site that was frozen, you may pop the blister along its edge with a sterile needle (boiled, heated under a flame, or cleaned with alcohol) to allow the fluid to drain out. If the blister does not bother you, no treatment is needed. ? Do NOT peel off the top of the blister roof. It will act as a dressing on top of your wound. WOUND CARE:   ? You may shower or bathe as usual, but avoid scrubbing the areas that have been frozen. ? Cleanse the site twice a day with mild soapy water, and then apply a thin film of white petrolatum (Vaseline©). ? You do not need to cover the area, but can if you prefer.

## 2020-12-17 ENCOUNTER — OFFICE VISIT (OUTPATIENT)
Dept: DERMATOLOGY | Age: 45
End: 2020-12-17
Payer: COMMERCIAL

## 2020-12-17 VITALS — TEMPERATURE: 97.9 F

## 2020-12-17 PROCEDURE — 17003 DESTRUCT PREMALG LES 2-14: CPT | Performed by: DERMATOLOGY

## 2020-12-17 PROCEDURE — 17000 DESTRUCT PREMALG LESION: CPT | Performed by: DERMATOLOGY

## 2020-12-17 PROCEDURE — 99213 OFFICE O/P EST LOW 20 MIN: CPT | Performed by: DERMATOLOGY

## 2021-01-25 ENCOUNTER — OFFICE VISIT (OUTPATIENT)
Dept: ORTHOPEDIC SURGERY | Age: 46
End: 2021-01-25
Payer: COMMERCIAL

## 2021-01-25 VITALS — WEIGHT: 136.02 LBS | BODY MASS INDEX: 26.71 KG/M2 | HEIGHT: 60 IN

## 2021-01-25 DIAGNOSIS — M79.18 MYOFASCIAL PAIN SYNDROME OF LUMBAR SPINE: ICD-10-CM

## 2021-01-25 DIAGNOSIS — M54.50 LOW BACK PAIN, UNSPECIFIED BACK PAIN LATERALITY, UNSPECIFIED CHRONICITY, UNSPECIFIED WHETHER SCIATICA PRESENT: Primary | ICD-10-CM

## 2021-01-25 DIAGNOSIS — M51.36 DDD (DEGENERATIVE DISC DISEASE), LUMBAR: ICD-10-CM

## 2021-01-25 PROCEDURE — 99214 OFFICE O/P EST MOD 30 MIN: CPT | Performed by: PHYSICIAN ASSISTANT

## 2021-01-25 RX ORDER — CYCLOBENZAPRINE HCL 10 MG
TABLET ORAL
Qty: 30 TABLET | Refills: 0 | Status: SHIPPED | OUTPATIENT
Start: 2021-01-25

## 2021-01-25 RX ORDER — METHYLPREDNISOLONE 4 MG/1
TABLET ORAL
Qty: 1 KIT | Refills: 0 | Status: SHIPPED | OUTPATIENT
Start: 2021-01-25 | End: 2021-01-31

## 2021-01-25 NOTE — PROGRESS NOTES
Follow up: SPINE    CHIEF COMPLAINT:    Chief Complaint   Patient presents with    Back Pain       HISTORY OF PRESENT ILLNESS:                The patient is a 39 y.o. female well-known to me, last seen October 2020 by Dr. Gt Mcdonald here for a 1 month history of aching low back pain. She also reports muscle tightness and \"spasms\". She states this recent flareup began after bending over. Her pain is typically worse with lumbar flexion although can be set off at random as well. Some relief with icy hot. Conservative care includes NSAIDs, Flexeril. She denies any lower extremity radiating pain, no numbness tingling or weakness. No recent bowel or bladder changes. No recent injury or trauma.       Pain Assessment  Location of Pain: Back  Severity of Pain: 3  Quality of Pain: Sharp, Dull  Duration of Pain: Persistent  Frequency of Pain: Constant  Aggravating Factors: Stairs, Walking, Squatting, Kneeling, Standing, Exercise, Straightening, Bending, Stretching  Limiting Behavior: Yes  Relieving Factors: Rest  Result of Injury: No  Work-Related Injury: No  Are there other pain locations you wish to document?: No      Past/Current Treatment      PT: HEP, past  Chiro:  Injections: 4/25/17: Rt L5/S1 TX NBA    1/14/2020 Left S1 transforaminal epidural injection--with relief  Medications:            NSAIDS: Past            Muscle relaxer: Flexeril            Steriods:   past            Neuropathic medications:              Opioids:            Other:, No  Surgery: no    Past Medical History: Medical history form was reviewed and scanned into the Media tab  Past Medical History:   Diagnosis Date    Endometriosis 1998    H/O blood clots 06/2017    RT leg X 2  per PT     Headache         REVIEW OF SYSTEMS:   CONSTITUTIONAL: Denies unexplained weight loss, fevers, chills or fatigue  NEUROLOGIC: Denies tremors or seizures         PHYSICAL EXAM: Vitals: Height 5' (1.524 m), weight 136 lb 0.4 oz (61.7 kg), not currently breastfeeding. GENERAL EXAM:  · General Apparence: Patient is adequately groomed with no evidence of malnutrition. · Orientation: The patient is oriented to time, place and person. · Mood & Affect:The patient's mood and affect are appropriate   · Lymphatic: The lymphatic examination bilaterally reveals all areas to be without enlargement or induration  · Sensation: Sensation is intact without deficit  · Coordination/Balance: Good coordination   ·   LUMBAR/SACRAL EXAMINATION:  · Inspection: Local inspection shows no step-off or bruising. Lumbar alignment is normal.  Sagittal and Coronal balance is neutral.      · Palpation:   No evidence of tenderness at the midline. No tenderness bilaterally at the paraspinal or trochanters. There is no step-off or paraspinal spasm. No focal trigger point. · Range of Motion: Moderate loss of flexion and extension due to guarding  · Strength:   Strength testing is 5/5 in all muscle groups tested. · Special Tests:   Straight leg raise and crossed SLR negative. Leg length and pelvis level.  0 out of 5 Bernard's signs. · Skin: There are no rashes, ulcerations or lesions. · Reflexes: Reflexes are symmetrically 2+ at the patellar and ankle tendons. Clonus absent bilaterally at the feet. · Gait & station: Normal unassisted  · Additional Examinations:   ·   · RIGHT LOWER EXTREMITY: Inspection/examination of the right lower extremity does not show any tenderness, deformity or injury. Range of motion is full. There is no gross instability. There are no rashes, ulcerations or lesions. Strength and tone are normal.  · LEFT LOWER EXTREMITY:  Inspection/examination of the left lower extremity does not show any tenderness, deformity or injury. Range of motion is full. There is no gross instability. There are no rashes, ulcerations or lesions.   Strength and tone are normal.    Diagnostic Testing: 2 views lumbar spine 1/25/2021 severe L5-S1 DDD    1/3/2020 views lumbar spine show advanced to space narrowing L5-S1     MRI report 2017 reviewed shows central disc protrusion moderate central stenosis        Impression:  1) Acute/chronic recurrent low back pain, underlying myofascial pain  2) L5-S1 DDD, underlying moderate central stenosis  3) h/o NBA      Plan:  1) PT for above with dry needling for myofascial component  2)   Orders Placed This Encounter   Medications    methylPREDNISolone (MEDROL, BRANDEE,) 4 MG tablet     Sig: Take by mouth.      Dispense:  1 kit     Refill:  0    cyclobenzaprine (FLEXERIL) 10 MG tablet     Sig: I po qHS PRN     Dispense:  30 tablet     Refill:  0     3) F/u 6wks if no improvement             [facility]

## 2021-05-24 ENCOUNTER — TELEPHONE (OUTPATIENT)
Dept: DERMATOLOGY | Age: 46
End: 2021-05-24

## 2021-06-05 NOTE — PROGRESS NOTES
[facility]) Dermatology  Karol Ames MD  [phone]      Wagner Maurice  1975    39 y.o. female     Date of Visit: 6/10/2021    Last Visit: 6mo    Chief Complaint: Skin check, moles    History of Present Illness:  1. Here for skin check. 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   -2006 severely dysplastic nevus, R mid arm - excised   -2006 moderately dysplastic nevus, R upper arm - excised  -2007 moderately dysplastic nevus, L medial chest - excised  -2009 moderately dysplastic nevus, R lower back - excised  -(+) family hx melanoma - paternal aunt    -No new moles. No moles changing in size, shape, color. No moles associated w/ pain, bleeding, pruritus.   -Stays out of sun as much as possible. Wears hats, SPF 30+ sunscreen regularly. 2. History of actinic keratoses s/p cryotherapy. 3. 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 4. Hx of mild ET/PP rosacea - previously used metrogel prn. Concerned about recurrent raised red lesions on nose     Derm History:   -Hx genital HSV (R buttock) - previously treated w/ famvir  -Hx perioral dermatitis - treated w/ tetracycline, PO erythromycin and metrocream  -Hx acute paronychia, R index finger 6/2020    Review of Systems:  Constitutional: Reports general sense of well-being. Skin: No interval severe sunburns. Allergies: Reviewed and updated. Past Medical History (notable for recent DVT on xarelto), Surgical History, Medications and Allergies reviewed.      Past Medical History:   Diagnosis Date    Endometriosis 0    H/O blood clots 06/2017    RT leg X 2  per PT     Headache      Past Surgical History:   Procedure Laterality Date    CERVICAL POLYP REMOVAL      CHOLECYSTECTOMY, LAPAROSCOPIC  1998    LAPAROSCOPIC APPENDECTOMY  1998    PAIN MANAGEMENT PROCEDURE Left 1/14/2020    LEFT LUMBAR SACRAL ONE EPIDURAL STEROID INJECTION SITE CONFIRMED BY FLUOROSCOPY performed by Conchis Quan MD

## 2021-06-10 ENCOUNTER — OFFICE VISIT (OUTPATIENT)
Dept: DERMATOLOGY | Age: 46
End: 2021-06-10
Payer: COMMERCIAL

## 2021-06-10 VITALS — TEMPERATURE: 99 F

## 2021-06-10 DIAGNOSIS — Z85.828 HISTORY OF NONMELANOMA SKIN CANCER: ICD-10-CM

## 2021-06-10 DIAGNOSIS — D22.9 MULTIPLE BENIGN NEVI: Primary | ICD-10-CM

## 2021-06-10 DIAGNOSIS — Z12.83 SCREENING EXAM FOR SKIN CANCER: ICD-10-CM

## 2021-06-10 DIAGNOSIS — L57.0 ACTINIC KERATOSES: ICD-10-CM

## 2021-06-10 DIAGNOSIS — L71.9 ROSACEA: ICD-10-CM

## 2021-06-10 DIAGNOSIS — Z86.006 HISTORY OF MELANOMA IN SITU: ICD-10-CM

## 2021-06-10 PROCEDURE — 17000 DESTRUCT PREMALG LESION: CPT | Performed by: DERMATOLOGY

## 2021-06-10 PROCEDURE — 17003 DESTRUCT PREMALG LES 2-14: CPT | Performed by: DERMATOLOGY

## 2021-06-10 PROCEDURE — 99213 OFFICE O/P EST LOW 20 MIN: CPT | Performed by: DERMATOLOGY

## (undated) DEVICE — GAUZE,SPONGE,4"X4",16PLY,STRL,LF,10/TRAY: Brand: MEDLINE

## (undated) DEVICE — CHLORAPREP 26ML ORANGE

## (undated) DEVICE — STERILE POLYISOPRENE POWDER-FREE SURGICAL GLOVES: Brand: PROTEXIS

## (undated) DEVICE — ALCOHOL RUBBING 16OZ 70% ISO

## (undated) DEVICE — UNIVERSAL BLOCK TRAY: Brand: MEDLINE INDUSTRIES, INC.

## (undated) DEVICE — TOWEL OR BLUEE 16X26IN ST 8 PACK ORB08 16X26ORTWL